# Patient Record
Sex: FEMALE | Race: WHITE | NOT HISPANIC OR LATINO
[De-identification: names, ages, dates, MRNs, and addresses within clinical notes are randomized per-mention and may not be internally consistent; named-entity substitution may affect disease eponyms.]

---

## 2017-02-03 PROBLEM — Z00.00 ENCOUNTER FOR PREVENTIVE HEALTH EXAMINATION: Status: ACTIVE | Noted: 2017-02-03

## 2017-02-03 NOTE — ASU PATIENT PROFILE, ADULT - PMH
Colon polyp    High cholesterol    MR (mitral regurgitation)    MVP (mitral valve prolapse)    MVP (mitral valve prolapse)    Pleurisy    RBBB    Sciatica    Vaginal polyp Colon polyp    High cholesterol    MR (mitral regurgitation)    MVP (mitral valve prolapse)    Pleurisy    RBBB    Sciatica    Vaginal polyp

## 2017-02-06 ENCOUNTER — TRANSCRIPTION ENCOUNTER (OUTPATIENT)
Age: 72
End: 2017-02-06

## 2017-02-06 ENCOUNTER — OUTPATIENT (OUTPATIENT)
Dept: OUTPATIENT SERVICES | Facility: HOSPITAL | Age: 72
LOS: 1 days | End: 2017-02-06
Payer: COMMERCIAL

## 2017-02-06 VITALS
HEART RATE: 69 BPM | OXYGEN SATURATION: 100 % | HEIGHT: 65.5 IN | TEMPERATURE: 99 F | WEIGHT: 143.3 LBS | RESPIRATION RATE: 14 BRPM | SYSTOLIC BLOOD PRESSURE: 155 MMHG | DIASTOLIC BLOOD PRESSURE: 69 MMHG

## 2017-02-06 VITALS
DIASTOLIC BLOOD PRESSURE: 54 MMHG | HEART RATE: 56 BPM | OXYGEN SATURATION: 100 % | SYSTOLIC BLOOD PRESSURE: 112 MMHG | RESPIRATION RATE: 16 BRPM

## 2017-02-06 DIAGNOSIS — H33.012 RETINAL DETACHMENT WITH SINGLE BREAK, LEFT EYE: ICD-10-CM

## 2017-02-06 DIAGNOSIS — Z90.89 ACQUIRED ABSENCE OF OTHER ORGANS: Chronic | ICD-10-CM

## 2017-02-06 PROCEDURE — C1814: CPT

## 2017-02-06 PROCEDURE — C1889: CPT

## 2017-02-06 PROCEDURE — C1784: CPT

## 2017-02-06 PROCEDURE — 67108 REPAIR DETACHED RETINA: CPT | Mod: LT

## 2017-02-06 NOTE — ASU DISCHARGE PLAN (ADULT/PEDIATRIC). - NOTIFY
Swelling that continues/Fever greater than 101/Bleeding that does not stop/Pain not relieved by Medications/Persistent Nausea and Vomiting

## 2017-02-06 NOTE — ASU DISCHARGE PLAN (ADULT/PEDIATRIC). - PT EDUC
other (specify)/Damien card, green bracelet on pt, Eye shield with instructions , sunglasses and eye kit given to patient.

## 2017-10-07 ENCOUNTER — EMERGENCY (EMERGENCY)
Facility: HOSPITAL | Age: 72
LOS: 1 days | Discharge: HOME | End: 2017-10-07
Attending: EMERGENCY MEDICINE | Admitting: EMERGENCY MEDICINE
Payer: COMMERCIAL

## 2017-10-07 VITALS
TEMPERATURE: 98 F | HEART RATE: 60 BPM | OXYGEN SATURATION: 100 % | WEIGHT: 141.98 LBS | HEIGHT: 65 IN | SYSTOLIC BLOOD PRESSURE: 136 MMHG | DIASTOLIC BLOOD PRESSURE: 85 MMHG
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
## 2017-10-07 DIAGNOSIS — Z88.2 ALLERGY STATUS TO SULFONAMIDES: ICD-10-CM

## 2017-10-07 DIAGNOSIS — I34.0 NONRHEUMATIC MITRAL (VALVE) INSUFFICIENCY: ICD-10-CM

## 2017-10-07 DIAGNOSIS — E78.5 HYPERLIPIDEMIA, UNSPECIFIED: ICD-10-CM

## 2017-10-07 DIAGNOSIS — Z90.89 ACQUIRED ABSENCE OF OTHER ORGANS: Chronic | ICD-10-CM
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
## 2017-10-07 DIAGNOSIS — T15.91XA FOREIGN BODY ON EXTERNAL EYE, PART UNSPECIFIED, RIGHT EYE, INITIAL ENCOUNTER: ICD-10-CM

## 2017-10-07 DIAGNOSIS — Z88.0 ALLERGY STATUS TO PENICILLIN: ICD-10-CM

## 2017-10-07 DIAGNOSIS — Z88.1 ALLERGY STATUS TO OTHER ANTIBIOTIC AGENTS STATUS: ICD-10-CM

## 2017-10-07 PROCEDURE — 99283 EMERGENCY DEPT VISIT LOW MDM: CPT

## 2017-10-07 NOTE — ED ADULT NURSE NOTE - PMH
Colon polyp    High cholesterol    MR (mitral regurgitation)    MVP (mitral valve prolapse)    Pleurisy    RBBB    Sciatica    Vaginal polyp

## 2017-10-07 NOTE — ED PROVIDER NOTE - ATTENDING CONTRIBUTION TO CARE
pt c/o fb sensation in right eye today. pt sometimes wears contacts, last used 2 weeks ago. no decrease in vision.  right eye contact lens in place, mild erythema conjunctiva, eomi, perrl.

## 2017-10-07 NOTE — ED PROVIDER NOTE - MEDICAL DECISION MAKING DETAILS
right foreign body sensation, found to have contact lense in eye, removed, follow up with opthalologist, opthalmic antibiotics

## 2017-10-07 NOTE — ED ADULT NURSE NOTE - OBJECTIVE STATEMENT
Pt reports possible foreign object in Right eye, reports has worsened, eye appears irritated and erythematous, no swelling noted, awaiting dispo

## 2017-10-07 NOTE — ED PROVIDER NOTE - PROGRESS NOTE DETAILS
pt now recalls that she couldn't find contact lens from her right eye last time she used contacts 2 weeks ago, and she noted that her vision from right eye was better than her baseline past 2 weeks pt now recalls that she couldn't find contact lens from her right eye last time she used contacts 2 weeks ago, and she noted that her vision from right eye was better than her baseline past 2 weeks. pt has tobramycin ophthalmic solution already, also has appt in 2 days with her ophthalmologist reeval after pt removed contact lens, cornea clear, no abrasion

## 2017-10-07 NOTE — ED PROVIDER NOTE - OBJECTIVE STATEMENT
73 yo female presents with right foreign body sensation states began this am.  denies discharge, no change in vision, states wears contacts occasionally, none used in 2 weeks.  Opthalmologist Dr Dinh

## 2017-10-08 PROBLEM — E78.00 PURE HYPERCHOLESTEROLEMIA, UNSPECIFIED: Chronic | Status: ACTIVE | Noted: 2017-02-03

## 2017-10-08 PROBLEM — M54.30 SCIATICA, UNSPECIFIED SIDE: Chronic | Status: ACTIVE | Noted: 2017-02-03

## 2017-10-08 PROBLEM — R09.1 PLEURISY: Chronic | Status: ACTIVE | Noted: 2017-02-03

## 2017-10-08 PROBLEM — I45.10 UNSPECIFIED RIGHT BUNDLE-BRANCH BLOCK: Chronic | Status: ACTIVE | Noted: 2017-02-03

## 2017-10-08 PROBLEM — N84.2 POLYP OF VAGINA: Chronic | Status: ACTIVE | Noted: 2017-02-03

## 2017-10-08 PROBLEM — I34.0 NONRHEUMATIC MITRAL (VALVE) INSUFFICIENCY: Chronic | Status: ACTIVE | Noted: 2017-02-03

## 2017-10-08 PROBLEM — I34.1 NONRHEUMATIC MITRAL (VALVE) PROLAPSE: Chronic | Status: ACTIVE | Noted: 2017-02-03

## 2017-10-08 PROBLEM — K63.5 POLYP OF COLON: Chronic | Status: ACTIVE | Noted: 2017-02-03

## 2018-05-10 ENCOUNTER — INPATIENT (INPATIENT)
Facility: HOSPITAL | Age: 73
LOS: 0 days | Discharge: ANOTHER HOSPITAL | DRG: 282 | End: 2018-05-11
Attending: INTERNAL MEDICINE | Admitting: HOSPITALIST
Payer: COMMERCIAL

## 2018-05-10 VITALS
DIASTOLIC BLOOD PRESSURE: 92 MMHG | SYSTOLIC BLOOD PRESSURE: 161 MMHG | HEIGHT: 66 IN | WEIGHT: 138.01 LBS | OXYGEN SATURATION: 98 % | HEART RATE: 86 BPM | RESPIRATION RATE: 15 BRPM | TEMPERATURE: 98 F

## 2018-05-10 DIAGNOSIS — Z90.89 ACQUIRED ABSENCE OF OTHER ORGANS: Chronic | ICD-10-CM

## 2018-05-10 LAB
ALBUMIN SERPL ELPH-MCNC: 3.7 G/DL — SIGNIFICANT CHANGE UP (ref 3.3–5)
ALP SERPL-CCNC: 70 U/L — SIGNIFICANT CHANGE UP (ref 40–120)
ALT FLD-CCNC: 22 U/L — SIGNIFICANT CHANGE UP (ref 12–78)
ANION GAP SERPL CALC-SCNC: 7 MMOL/L — SIGNIFICANT CHANGE UP (ref 5–17)
AST SERPL-CCNC: 23 U/L — SIGNIFICANT CHANGE UP (ref 15–37)
BILIRUB SERPL-MCNC: 0.2 MG/DL — SIGNIFICANT CHANGE UP (ref 0.2–1.2)
BUN SERPL-MCNC: 27 MG/DL — HIGH (ref 7–23)
CALCIUM SERPL-MCNC: 9.1 MG/DL — SIGNIFICANT CHANGE UP (ref 8.5–10.1)
CHLORIDE SERPL-SCNC: 105 MMOL/L — SIGNIFICANT CHANGE UP (ref 96–108)
CK SERPL-CCNC: 112 U/L — SIGNIFICANT CHANGE UP (ref 26–192)
CO2 SERPL-SCNC: 30 MMOL/L — SIGNIFICANT CHANGE UP (ref 22–31)
CREAT SERPL-MCNC: 0.78 MG/DL — SIGNIFICANT CHANGE UP (ref 0.5–1.3)
D DIMER BLD IA.RAPID-MCNC: <150 NG/ML DDU — SIGNIFICANT CHANGE UP
GLUCOSE SERPL-MCNC: 153 MG/DL — HIGH (ref 70–99)
HCT VFR BLD CALC: 41.5 % — SIGNIFICANT CHANGE UP (ref 34.5–45)
HGB BLD-MCNC: 14.4 G/DL — SIGNIFICANT CHANGE UP (ref 11.5–15.5)
INR BLD: 1.02 RATIO — SIGNIFICANT CHANGE UP (ref 0.88–1.16)
MCHC RBC-ENTMCNC: 30.8 PG — SIGNIFICANT CHANGE UP (ref 27–34)
MCHC RBC-ENTMCNC: 34.6 GM/DL — SIGNIFICANT CHANGE UP (ref 32–36)
MCV RBC AUTO: 88.9 FL — SIGNIFICANT CHANGE UP (ref 80–100)
PLATELET # BLD AUTO: 200 K/UL — SIGNIFICANT CHANGE UP (ref 150–400)
POTASSIUM SERPL-MCNC: 3.9 MMOL/L — SIGNIFICANT CHANGE UP (ref 3.5–5.3)
POTASSIUM SERPL-SCNC: 3.9 MMOL/L — SIGNIFICANT CHANGE UP (ref 3.5–5.3)
PROT SERPL-MCNC: 7.5 G/DL — SIGNIFICANT CHANGE UP (ref 6–8.3)
PROTHROM AB SERPL-ACNC: 11.1 SEC — SIGNIFICANT CHANGE UP (ref 9.8–12.7)
RBC # BLD: 4.67 M/UL — SIGNIFICANT CHANGE UP (ref 3.8–5.2)
RBC # FLD: 12 % — SIGNIFICANT CHANGE UP (ref 10.3–14.5)
SODIUM SERPL-SCNC: 142 MMOL/L — SIGNIFICANT CHANGE UP (ref 135–145)
TROPONIN I SERPL-MCNC: 0.02 NG/ML — SIGNIFICANT CHANGE UP (ref 0.01–0.04)
WBC # BLD: 7.2 K/UL — SIGNIFICANT CHANGE UP (ref 3.8–10.5)
WBC # FLD AUTO: 7.2 K/UL — SIGNIFICANT CHANGE UP (ref 3.8–10.5)

## 2018-05-10 RX ORDER — ASPIRIN/CALCIUM CARB/MAGNESIUM 324 MG
325 TABLET ORAL ONCE
Qty: 0 | Refills: 0 | Status: COMPLETED | OUTPATIENT
Start: 2018-05-10 | End: 2018-05-10

## 2018-05-10 RX ORDER — NITROGLYCERIN 6.5 MG
0.4 CAPSULE, EXTENDED RELEASE ORAL ONCE
Qty: 0 | Refills: 0 | Status: COMPLETED | OUTPATIENT
Start: 2018-05-10 | End: 2018-05-10

## 2018-05-10 RX ADMIN — Medication 325 MILLIGRAM(S): at 23:00

## 2018-05-10 RX ADMIN — Medication 0.4 MILLIGRAM(S): at 23:00

## 2018-05-10 NOTE — ED PROVIDER NOTE - OBJECTIVE STATEMENT
71 y/o WF H/O High cholesterol  MVP C/O Right jaw pain at rest, no CP, no SOB, no diaphoresis, no N/V. No Tooth pain, no dental pain, no TMJ.

## 2018-05-11 ENCOUNTER — TRANSCRIPTION ENCOUNTER (OUTPATIENT)
Age: 73
End: 2018-05-11

## 2018-05-11 ENCOUNTER — INPATIENT (INPATIENT)
Facility: HOSPITAL | Age: 73
LOS: 0 days | Discharge: HOME | DRG: 287 | End: 2018-05-11
Attending: INTERNAL MEDICINE | Admitting: INTERNAL MEDICINE
Payer: COMMERCIAL

## 2018-05-11 VITALS
TEMPERATURE: 98 F | SYSTOLIC BLOOD PRESSURE: 170 MMHG | HEART RATE: 74 BPM | RESPIRATION RATE: 14 BRPM | OXYGEN SATURATION: 98 % | DIASTOLIC BLOOD PRESSURE: 75 MMHG

## 2018-05-11 VITALS
WEIGHT: 139.99 LBS | HEART RATE: 79 BPM | SYSTOLIC BLOOD PRESSURE: 160 MMHG | RESPIRATION RATE: 18 BRPM | HEIGHT: 66 IN | OXYGEN SATURATION: 99 % | TEMPERATURE: 98 F | DIASTOLIC BLOOD PRESSURE: 85 MMHG

## 2018-05-11 VITALS
RESPIRATION RATE: 17 BRPM | SYSTOLIC BLOOD PRESSURE: 135 MMHG | OXYGEN SATURATION: 98 % | DIASTOLIC BLOOD PRESSURE: 67 MMHG | TEMPERATURE: 98 F | HEART RATE: 72 BPM

## 2018-05-11 DIAGNOSIS — I21.4 NON-ST ELEVATION (NSTEMI) MYOCARDIAL INFARCTION: ICD-10-CM

## 2018-05-11 DIAGNOSIS — Z29.9 ENCOUNTER FOR PROPHYLACTIC MEASURES, UNSPECIFIED: ICD-10-CM

## 2018-05-11 DIAGNOSIS — Z98.890 OTHER SPECIFIED POSTPROCEDURAL STATES: Chronic | ICD-10-CM

## 2018-05-11 DIAGNOSIS — I24.9 ACUTE ISCHEMIC HEART DISEASE, UNSPECIFIED: ICD-10-CM

## 2018-05-11 DIAGNOSIS — Z90.89 ACQUIRED ABSENCE OF OTHER ORGANS: Chronic | ICD-10-CM

## 2018-05-11 DIAGNOSIS — M26.69 OTHER SPECIFIED DISORDERS OF TEMPOROMANDIBULAR JOINT: ICD-10-CM

## 2018-05-11 LAB
CK SERPL-CCNC: 86 U/L — SIGNIFICANT CHANGE UP (ref 26–192)
TROPONIN I SERPL-MCNC: 0.24 NG/ML — HIGH (ref 0.01–0.04)

## 2018-05-11 PROCEDURE — 84484 ASSAY OF TROPONIN QUANT: CPT

## 2018-05-11 PROCEDURE — 71045 X-RAY EXAM CHEST 1 VIEW: CPT | Mod: 26

## 2018-05-11 PROCEDURE — C1894: CPT

## 2018-05-11 PROCEDURE — 93458 L HRT ARTERY/VENTRICLE ANGIO: CPT | Mod: 26

## 2018-05-11 PROCEDURE — 99285 EMERGENCY DEPT VISIT HI MDM: CPT

## 2018-05-11 PROCEDURE — C1769: CPT

## 2018-05-11 PROCEDURE — 85379 FIBRIN DEGRADATION QUANT: CPT

## 2018-05-11 PROCEDURE — 99285 EMERGENCY DEPT VISIT HI MDM: CPT | Mod: 25

## 2018-05-11 PROCEDURE — 85610 PROTHROMBIN TIME: CPT

## 2018-05-11 PROCEDURE — 85027 COMPLETE CBC AUTOMATED: CPT

## 2018-05-11 PROCEDURE — 96376 TX/PRO/DX INJ SAME DRUG ADON: CPT

## 2018-05-11 PROCEDURE — 96374 THER/PROPH/DIAG INJ IV PUSH: CPT

## 2018-05-11 PROCEDURE — 93458 L HRT ARTERY/VENTRICLE ANGIO: CPT

## 2018-05-11 PROCEDURE — 93010 ELECTROCARDIOGRAM REPORT: CPT

## 2018-05-11 PROCEDURE — 82550 ASSAY OF CK (CPK): CPT

## 2018-05-11 PROCEDURE — 71045 X-RAY EXAM CHEST 1 VIEW: CPT

## 2018-05-11 PROCEDURE — C1887: CPT

## 2018-05-11 PROCEDURE — 93005 ELECTROCARDIOGRAM TRACING: CPT

## 2018-05-11 PROCEDURE — 99291 CRITICAL CARE FIRST HOUR: CPT

## 2018-05-11 PROCEDURE — 80053 COMPREHEN METABOLIC PANEL: CPT

## 2018-05-11 PROCEDURE — 99223 1ST HOSP IP/OBS HIGH 75: CPT | Mod: AI

## 2018-05-11 PROCEDURE — 99152 MOD SED SAME PHYS/QHP 5/>YRS: CPT

## 2018-05-11 RX ORDER — METOPROLOL TARTRATE 50 MG
25 TABLET ORAL
Qty: 0 | Refills: 0 | Status: DISCONTINUED | OUTPATIENT
Start: 2018-05-11 | End: 2018-05-11

## 2018-05-11 RX ORDER — HEPARIN SODIUM 5000 [USP'U]/ML
3800 INJECTION INTRAVENOUS; SUBCUTANEOUS ONCE
Qty: 0 | Refills: 0 | Status: COMPLETED | OUTPATIENT
Start: 2018-05-11 | End: 2018-05-11

## 2018-05-11 RX ORDER — METOPROLOL TARTRATE 50 MG
1 TABLET ORAL
Qty: 0 | Refills: 0 | COMMUNITY

## 2018-05-11 RX ORDER — SIMVASTATIN 20 MG/1
40 TABLET, FILM COATED ORAL DAILY
Qty: 0 | Refills: 0 | Status: DISCONTINUED | OUTPATIENT
Start: 2018-05-11 | End: 2018-05-11

## 2018-05-11 RX ORDER — HEPARIN SODIUM 5000 [USP'U]/ML
INJECTION INTRAVENOUS; SUBCUTANEOUS
Qty: 25000 | Refills: 0 | Status: DISCONTINUED | OUTPATIENT
Start: 2018-05-11 | End: 2018-05-11

## 2018-05-11 RX ORDER — ASPIRIN/CALCIUM CARB/MAGNESIUM 324 MG
1 TABLET ORAL
Qty: 0 | Refills: 0 | COMMUNITY

## 2018-05-11 RX ORDER — SIMVASTATIN 20 MG/1
1 TABLET, FILM COATED ORAL
Qty: 0 | Refills: 0 | COMMUNITY

## 2018-05-11 RX ORDER — ASPIRIN/CALCIUM CARB/MAGNESIUM 324 MG
325 TABLET ORAL DAILY
Qty: 0 | Refills: 0 | Status: DISCONTINUED | OUTPATIENT
Start: 2018-05-11 | End: 2018-05-11

## 2018-05-11 RX ORDER — HEPARIN SODIUM 5000 [USP'U]/ML
0 INJECTION INTRAVENOUS; SUBCUTANEOUS
Qty: 0 | Refills: 0 | COMMUNITY

## 2018-05-11 RX ORDER — CLOPIDOGREL BISULFATE 75 MG/1
2 TABLET, FILM COATED ORAL
Qty: 0 | Refills: 0 | COMMUNITY

## 2018-05-11 RX ORDER — SIMVASTATIN 20 MG/1
40 TABLET, FILM COATED ORAL AT BEDTIME
Qty: 0 | Refills: 0 | Status: DISCONTINUED | OUTPATIENT
Start: 2018-05-11 | End: 2018-05-11

## 2018-05-11 RX ORDER — CLOPIDOGREL BISULFATE 75 MG/1
600 TABLET, FILM COATED ORAL ONCE
Qty: 0 | Refills: 0 | Status: COMPLETED | OUTPATIENT
Start: 2018-05-11 | End: 2018-05-11

## 2018-05-11 RX ORDER — HEPARIN SODIUM 5000 [USP'U]/ML
3800 INJECTION INTRAVENOUS; SUBCUTANEOUS EVERY 6 HOURS
Qty: 0 | Refills: 0 | Status: DISCONTINUED | OUTPATIENT
Start: 2018-05-11 | End: 2018-05-11

## 2018-05-11 RX ADMIN — CLOPIDOGREL BISULFATE 600 MILLIGRAM(S): 75 TABLET, FILM COATED ORAL at 07:02

## 2018-05-11 RX ADMIN — HEPARIN SODIUM 3800 UNIT(S): 5000 INJECTION INTRAVENOUS; SUBCUTANEOUS at 07:02

## 2018-05-11 RX ADMIN — HEPARIN SODIUM 750 UNIT(S)/HR: 5000 INJECTION INTRAVENOUS; SUBCUTANEOUS at 07:19

## 2018-05-11 NOTE — DISCHARGE NOTE ADULT - HOSPITAL COURSE
72f apparently with mild hyperlipidemia, improved with diet, MR, MVP,  Former smoker.  No baseline sxs of angina, hf or arrhythmia. Pt Feeling well until last night when developed  8/10 right sided jaw discomfort, without rad or assoc sxs.  Did not improve with a warm compress or Tylenol and became concerned and came to the  ER [place] on 5/10/18.  EKG without ischema.  First set of cardiac enzymes normal, second set with significant elevation of troponin suggesting ischemic process.    Trop were .020>0.238  ACS Protocol initiated-ASA/Plavix/Heparin drip  Pt transferred to [facility] for Cardiac Cath.     S/P Diagnostic Cath-Normal Coronaries, LVEF 60%. Pt tolerated procedure well with no post cath complications and hospitalization remained uneventful. Pt stable for discharge home.

## 2018-05-11 NOTE — DISCHARGE NOTE ADULT - ADDITIONAL INSTRUCTIONS
No heavy lifting or pushing/pulling with procedure arm for 2 weeks. No driving for 2 days. You may shower 24 hours following the procedure but avoid baths/swimming for 1 week. Check your wrist site for bleeding and/or swelling daily following procedure and call your doctor immediately if it occurs or if you experience increased pain at the site. Follow up with your cardiologist in 1-2 weeks. You may call [facility] if you have any questions/concerns regarding your procedure [phone].

## 2018-05-11 NOTE — H&P ADULT - NSHPLABSRESULTS_GEN_ALL_CORE
14.4   7.2   )-----------( 200      ( 10 May 2018 23:20 )             41.5     05-10    142  |  105  |  27<H>  ----------------------------<  153<H>  3.9   |  30  |  0.78    Ca    9.1      10 May 2018 23:20    TPro  7.5  /  Alb  3.7  /  TBili  0.2  /  DBili  x   /  AST  23  /  ALT  22  /  AlkPhos  70  05-10    PT/INR - ( 10 May 2018 23:20 )   PT: 11.1 sec;   INR: 1.02 ratio    My interpretation:  EKG 5/10/18 22:47: NSR at 75 bpm no evidence of st elevation or depression, single lead T wave inversion on Lead III   Repeat EKG 5/11/18 6:08: NSR at 75 bpm no evidence of st elevation or depression, single lead T wave inversion on Lead III

## 2018-05-11 NOTE — DISCHARGE NOTE ADULT - CARE PLAN
Principal Discharge DX:	Chest pain, unspecified type  Goal:	You will be free of chest pain or shortness of breath.  Assessment and plan of treatment:	Low salt, low fat diet.   Weight management.   Take medications as prescribed.    No smoking.  Follow up appointments with your doctor(s)  as instruced.

## 2018-05-11 NOTE — H&P ADULT - PROBLEM SELECTOR PLAN 1
admit to tele  - started on heparin drip  - Plavix load given  - aspirin, metoprolol and Zocor ordered  - Dr. Leonard cardio consulted by ED  - 2decho ordered  - will check TSH and Ft4 level  - monitor serial enzymes  - will keep npo except meds for now until cardio evals patient

## 2018-05-11 NOTE — DISCHARGE NOTE ADULT - CARE PROVIDER_API CALL
Roger Lopez  [address] [place] [address], [place] [address]  Phone: [phone]  Fax: (       -

## 2018-05-11 NOTE — H&P ADULT - HISTORY OF PRESENT ILLNESS
73 y/o female with no past medical history per patient comes in with cc of right jaw pain that began last night at 10pm. She tried to apply warm compressions with no relief and called 911 came to ER for eval. In ER pt second trop is positive. Pt denies any substernal chest pain, no loc, no dizziness, no n/v no headaches. She does feel a little lightheaded, no prior episodes like this. Pt being admitted for NSTEMI.

## 2018-05-11 NOTE — CONSULT NOTE ADULT - SUBJECTIVE AND OBJECTIVE BOX
[facility]         Jenelle Scales, Abi, Gwen, Vandana, Fred Heredia        [phone] (office)    CHIEF COMPLAINT: Patient is a 72y old  Female who presents with a chief complaint of right jaw claudication (11 May 2018 07:54)      HPI:  72f apparently with mild hyperlipidemia, improved with diet.  No htn, dm.  Former smoker.  No baseline sxs of angina, hf or arrhythmia.    Feeling well until last night when developed  8/10 right sided jaw discomfort, without rad or assoc sxs.  Did not improve with a warm compress or Tylenol and became concerned and came to the er.  EKG without ischema.  First set of cardiac enzymes normal, second set with significant elevation of troponin suggesting ischemic process.  Given dapt and heparin gtt.  Consultation is now being obtained.        PAST MEDICAL & SURGICAL HISTORY:  Colon polyp  Vaginal polyp  Sciatica  RBBB  Pleurisy  MVP (mitral valve prolapse)  MR (mitral regurgitation)  High cholesterol  History of tonsillectomy      SOCIAL HISTORY: No active tobacco, alcohol or illicit drug use    FAMILY HISTORY:  Family history of multiple myeloma (Sibling)      Outpatient medications: none    MEDICATIONS  (STANDING):  aspirin enteric coated 325 milliGRAM(s) Oral daily  heparin  Infusion.  Unit(s)/Hr (7.5 mL/Hr) IV Continuous <Continuous>  metoprolol tartrate 25 milliGRAM(s) Oral two times a day  simvastatin 40 milliGRAM(s) Oral daily    MEDICATIONS  (PRN):  heparin  Injectable 3800 Unit(s) IV Push every 6 hours PRN For aPTT less than 40      Allergies    cephalexin (Faint)  Cipro (Rash)  penicillin (Unknown)  sulfa drugs (Rash)    Intolerances    codeine (Nausea)  Macrobid (Nausea; Fever)      REVIEW OF SYSTEMS: Is negative for eye, ENT, GI, , allergic, dermatologic, musculoskeletal and neurologic, except as described above.    VITAL SIGNS:   Vital Signs Last 24 Hrs  T(C): 36.8 (11 May 2018 08:07), Max: 36.9 (10 May 2018 22:39)  T(F): 98.3 (11 May 2018 08:07), Max: 98.4 (10 May 2018 22:39)  HR: 74 (11 May 2018 08:07) (56 - 86)  BP: 170/75 (11 May 2018 08:07) (112/56 - 170/75)  BP(mean): --  RR: 14 (11 May 2018 08:07) (12 - 15)  SpO2: 98% (11 May 2018 08:07) (97% - 98%)    I&O's Summary      PHYSICAL EXAM:    Constitutional: NAD, awake and alert, well-developed  Eyes:  EOMI, no oral cyanosis, conjunctivae clear, anicteric.  Pulmonary: Non-labored, breath sounds are clear bilaterally, no wheezing, rales or rhonchi  Cardiovascular:  regular S1 and S2. No murmur.  No rubs, gallops or clicks  Gastrointestinal: Bowel Sounds present, soft, nontender.   Lymph: No peripheral edema.   Neurological: Alert, strength and sensitivity are grossly intact  Skin: No obvious lesions/rashes.   Psych:  Mood & affect appropriate .    LABS: All Labs Reviewed:                        14.4   7.2   )-----------( 200      ( 10 May 2018 23:20 )             41.5     10 May 2018 23:20    142    |  105    |  27     ----------------------------<  153    3.9     |  30     |  0.78     Ca    9.1        10 May 2018 23:20    TPro  7.5    /  Alb  3.7    /  TBili  0.2    /  DBili  x      /  AST  23     /  ALT  22     /  AlkPhos  70     10 May 2018 23:20    PT/INR - ( 10 May 2018 23:20 )   PT: 11.1 sec;   INR: 1.02 ratio           CARDIAC MARKERS ( 11 May 2018 05:22 )  .238 ng/mL / x     / 86 U/L / x     / x      CARDIAC MARKERS ( 10 May 2018 23:20 )  .020 ng/mL / x     / 112 U/L / x     / x          Blood Culture:         RADIOLOGY:    EKG: nsr

## 2018-05-11 NOTE — ED ADULT NURSE REASSESSMENT NOTE - COMFORT CARE
repositioned/assisted to bathroom/side rails up/wait time explained/warm blanket provided/plan of care explained/darkened lights

## 2018-05-11 NOTE — CONSULT NOTE ADULT - ASSESSMENT
72f apparently with mild hyperlipidemia, improved with diet.  No htn, dm.  Former smoker.  No baseline sxs of angina, hf or arrhythmia.    Feeling well until last night when developed  8/10 right sided jaw discomfort, without rad or assoc sxs.  Did not improve with a warm compress or Tylenol and became concerned and came to the er.  EKG without ischema.  First set of cardiac enzymes normal, second set with significant elevation of troponin suggesting ischemic process.  Given dapt and heparin gtt.  Consultation is now being obtained.      -intermediate risk individual, based on age and dyslipidemia  -jaw discomfort may be ischemic sx  -normal ekg, but elevation in troponin  --given that there is no other clear explanation for her jaw discomfort other than a manifestation of acs, will treat aggressively  -dapt, heparin, statin, bb  -have arranged tx to [facility] for cardiac cath, with intervention if indicated    The patient is at risk of abrupt decompensation.  35 minutes was spent with this patient.

## 2018-05-11 NOTE — DISCHARGE NOTE ADULT - PLAN OF CARE
You will be free of chest pain or shortness of breath. Low salt, low fat diet.   Weight management.   Take medications as prescribed.    No smoking.  Follow up appointments with your doctor(s)  as instruced.

## 2018-05-11 NOTE — DISCHARGE NOTE ADULT - HOSPITAL COURSE
73 y/o female with no past medical history per patient comes in with cc of right jaw pain that began last night at 10pm. She tried to apply warm compressions with no relief and called 911 came to ER for eval. In ER pt second trop is positive. Pt denies any substernal chest pain, no loc, no dizziness, no n/v no headaches. She does feel a little lightheaded, no prior episodes like this. Pt was admitted for NSTEMI.      Problem/Plan - 1:  ·  Problem: NSTEMI (non-ST elevated myocardial infarction).  Plan: admit to tele  - started on heparin drip  - Plavix load given  - aspirin, metoprolol and Zocor ordered  - Dr. Leonard cardio recommend transfer to [place]   - will keep npo except meds for cardiac cath     Problem/Plan - 2:  ·  Problem: Jaw claudication.  Plan: Likely due to above   - plan as above    pt transferred to [facility]

## 2018-05-11 NOTE — DISCHARGE NOTE ADULT - CARE PLAN
Principal Discharge DX:	NSTEMI (non-ST elevated myocardial infarction)  Goal:	transferred to [place]  Assessment and plan of treatment:	to [place] for cardiac cath  Secondary Diagnosis:	Jaw claudication

## 2018-05-11 NOTE — ED ADULT NURSE NOTE - CHPI ED SYMPTOMS NEG
no diaphoresis/no chills/no syncope/no dizziness/no back pain/no chest pain/no cough/no fever/no vomiting/no shortness of breath/no nausea

## 2018-05-11 NOTE — DISCHARGE NOTE ADULT - MEDICATION SUMMARY - MEDICATIONS TO STOP TAKING
I will STOP taking the medications listed below when I get home from the hospital:    heparin 1 unit/mL-NaCl 0.9% intravenous solution (obsolete)  -- hospital    aspirin 325 mg oral tablet  -- 1 tab(s) by mouth once a day    hospital    Plavix 300 mg oral tablet  -- 2 tab(s) by mouth once    hospital    metoprolol tartrate 25 mg oral tablet  -- 1 tab(s) by mouth 2 times a day    hospital    Zocor 40 mg oral tablet  -- 1 tab(s) by mouth once a day (at bedtime)    hospital

## 2018-05-11 NOTE — DISCHARGE NOTE ADULT - CONDITIONS AT DISCHARGE
Pt verbalized understanding of discharge instructions. Pt alert and oriented x4, ambulatory, voiding, tolerating diet, vss. Pt verbalized understanding of medications prescribed and to follow up with MD in time ordered.  IVL removed. Safety maintained.  Pt s/p cath no complaint of pain. right radial  site benign no bleeding or hematoma noted.  Positive pulses in bilateral dorsalis pedis. Extremities warm and mobile.

## 2018-05-11 NOTE — DISCHARGE NOTE ADULT - PATIENT PORTAL LINK FT
You can access the [facility] Portal, offered by [facility], by registering with the following website: [email or web address]

## 2018-05-11 NOTE — ED ADULT NURSE NOTE - OBJECTIVE STATEMENT
Pt to ED with c/c pain to jaw tonight, pt took Tylenol w/o relief. Pain unrelieved by Nitroglycerine or aspirin. Pt denies chest pain, arm pain, or diaphoresis

## 2018-05-11 NOTE — H&P CARDIOLOGY - HISTORY OF PRESENT ILLNESS
72f apparently with mild hyperlipidemia, improved with diet, MR, MVP,  Former smoker.  No baseline sxs of angina, hf or arrhythmia. Pt Feeling well until last night when developed  8/10 right sided jaw discomfort, without rad or assoc sxs.  Did not improve with a warm compress or Tylenol and became concerned and came to the  ER [place] on 5/11/18.  EKG without ischema.  First set of cardiac enzymes normal, second set with significant elevation of troponin suggesting ischemic process.    Trop were .020>0.238  Pt transferred to [facility] for Cardiac Cath. 72f apparently with mild hyperlipidemia, improved with diet, MR, MVP,  Former smoker.  No baseline sxs of angina, hf or arrhythmia. Pt Feeling well until last night when developed  8/10 right sided jaw discomfort, without rad or assoc sxs.  Did not improve with a warm compress or Tylenol and became concerned and came to the  ER [place] on 5/10/18.  EKG without ischema.  First set of cardiac enzymes normal, second set with significant elevation of troponin suggesting ischemic process.    Trop were .020>0.238  Pt transferred to [facility] for Cardiac Cath. 72f apparently with mild hyperlipidemia, improved with diet, MR, MVP,  Former smoker.  No baseline sxs of angina, hf or arrhythmia. Pt Feeling well until last night when developed  8/10 right sided jaw discomfort, without rad or assoc sxs.  Did not improve with a warm compress or Tylenol and became concerned and came to the  ER [place] on 5/10/18.  EKG without ischema.  First set of cardiac enzymes normal, second set with significant elevation of troponin suggesting ischemic process.    Trop were .020>0.238  ACS Protocol initiated-ASA/Plavix/Heparin drip  Pt transferred to [facility] for Cardiac Cath.

## 2018-05-11 NOTE — ED ADULT NURSE NOTE - CHIEF COMPLAINT QUOTE
"im having jaw pain today" pt experienced neck pain yesterday. pt also c/o intermittent palpitations. pt denies blurred vision, chest pain, dizziness, trauma/injury, sob

## 2018-05-11 NOTE — DISCHARGE NOTE ADULT - PROVIDER TOKENS
FREE:[LAST:[Jessica],FIRST:[Roger Park],PHONE:[phone]],FAX:[(   [phone]    -],ADDRESS:[[address] [place] [address]]]

## 2018-05-11 NOTE — ED ADULT NURSE REASSESSMENT NOTE - NS ED NURSE REASSESS COMMENT FT1
pt resting comfortably with HOB elevated at 60 degrees. call bell in reach. Pt states pain persits to jaw, no change after nitro and aspirin. Pt to have repeat labs at 0500
received in no distress, aox3,no sob,denies chest pain,v/sstable and awaits room assignment on bed with rails up.

## 2018-05-11 NOTE — H&P ADULT - NSHPPHYSICALEXAM_GEN_ALL_CORE
PHYSICAL EXAM:  Vital Signs Last 24 Hrs  T(C): 36.5 (11 May 2018 05:10), Max: 36.9 (10 May 2018 22:39)  T(F): 97.7 (11 May 2018 05:10), Max: 98.4 (10 May 2018 22:39)  HR: 56 (11 May 2018 05:10) (56 - 86)  BP: 119/54 (11 May 2018 05:10) (112/56 - 161/92)  BP(mean): --  RR: 12 (11 May 2018 05:10) (12 - 15)  SpO2: 97% (11 May 2018 05:10) (97% - 98%)    GENERAL: NAD, well-groomed, well-developed  HEAD:  Atraumatic, Normocephalic  EYES: EOMI, PERRLA, conjunctiva and sclera clear  ENMT: No tonsillar erythema, exudates, or enlargement; Moist mucous membranes, Good dentition, No lesions  NECK: Supple, No JVD, no carotid briuts  NERVOUS SYSTEM:  Alert & Oriented X3, Good concentration; Motor Strength 5/5 B/L upper and lower extremities  CHEST/LUNG: Clear to auscultation bilaterally; No rales, rhonchi, wheezing, or rubs  HEART: Regular rate and rhythm; No murmurs  ABDOMEN: Soft, Nontender, Nondistended; Bowel sounds present  EXTREMITIES:  2+ Peripheral Pulses, No clubbing, cyanosis, or edema  SKIN: No rashes or lesions

## 2018-05-11 NOTE — H&P ADULT - ASSESSMENT
71 y/o female with no past medical history per patient comes in with cc of right jaw pain that began last night at 10pm. She tried to apply warm compressions with no relief and called 911 came to ER for eval. In ER pt second trop is positive. Pt denies any substernal chest pain, no loc, no dizziness, no n/v no headaches. She does feel a little lightheaded, no prior episodes like this. Pt being admitted for NSTEMI.

## 2020-11-28 NOTE — H&P CARDIOLOGY - PRO TOBACCO TYPE
Pt had ERIKA drain placed to abd today. Draining well. PRN pain med administered per order. IV abt administered through DL PICC. Pt up ambulating independently. All needs voiced and met. Will continue with plan of care.   cigarettes/quit 37 yrs ago

## 2023-01-28 ENCOUNTER — OFFICE (OUTPATIENT)
Dept: URBAN - METROPOLITAN AREA CLINIC 109 | Facility: CLINIC | Age: 78
Setting detail: OPHTHALMOLOGY
End: 2023-01-28
Payer: MEDICARE

## 2023-01-28 VITALS — HEIGHT: 55 IN

## 2023-01-28 DIAGNOSIS — H40.013: ICD-10-CM

## 2023-01-28 PROCEDURE — 99213 OFFICE O/P EST LOW 20 MIN: CPT | Performed by: OPHTHALMOLOGY

## 2023-01-28 ASSESSMENT — REFRACTION_AUTOREFRACTION
OD_AXIS: 169
OS_AXIS: 178
OS_CYLINDER: -1.25
OD_CYLINDER: -0.50
OD_SPHERE: +0.25
OS_SPHERE: +1.25

## 2023-01-28 ASSESSMENT — CONFRONTATIONAL VISUAL FIELD TEST (CVF)
OD_FINDINGS: FULL
OS_FINDINGS: FULL

## 2023-01-28 ASSESSMENT — VISUAL ACUITY
OS_BCVA: 20/20
OD_BCVA: 20/20

## 2023-01-28 ASSESSMENT — TONOMETRY
OS_IOP_MMHG: 16
OD_IOP_MMHG: 16

## 2023-01-28 ASSESSMENT — KERATOMETRY
OS_K2POWER_DIOPTERS: 44.12
OS_AXISANGLE_DEGREES: 93
OD_K1POWER_DIOPTERS: 41.87
OD_AXISANGLE_DEGREES: 81
OD_K2POWER_DIOPTERS: 42.87
OS_K1POWER_DIOPTERS: 42.12

## 2023-01-28 ASSESSMENT — SUPERFICIAL PUNCTATE KERATITIS (SPK)
OD_SPK: 1+
OS_SPK: 1+

## 2023-01-28 ASSESSMENT — SPHEQUIV_DERIVED
OD_SPHEQUIV: 0
OS_SPHEQUIV: 0.625

## 2023-01-28 ASSESSMENT — AXIALLENGTH_DERIVED
OS_AL: 23.4884
OD_AL: 24.0144

## 2023-03-15 NOTE — ASU PREOP CHECKLIST - ISOLATION PRECAUTIONS
none Price (Do Not Change): 0.00 Detail Level: Simple Instructions: This plan will send the code FBSE to the PM system.  DO NOT or CHANGE the price.

## 2023-05-10 ENCOUNTER — OFFICE (OUTPATIENT)
Dept: URBAN - METROPOLITAN AREA CLINIC 109 | Facility: CLINIC | Age: 78
Setting detail: OPHTHALMOLOGY
End: 2023-05-10
Payer: MEDICARE

## 2023-05-10 DIAGNOSIS — H40.013: ICD-10-CM

## 2023-05-10 DIAGNOSIS — H33.002: ICD-10-CM

## 2023-05-10 DIAGNOSIS — H16.223: ICD-10-CM

## 2023-05-10 DIAGNOSIS — D31.32: ICD-10-CM

## 2023-05-10 DIAGNOSIS — H43.811: ICD-10-CM

## 2023-05-10 PROCEDURE — 92083 EXTENDED VISUAL FIELD XM: CPT | Performed by: OPHTHALMOLOGY

## 2023-05-10 PROCEDURE — 92133 CPTRZD OPH DX IMG PST SGM ON: CPT | Performed by: OPHTHALMOLOGY

## 2023-05-10 PROCEDURE — 99213 OFFICE O/P EST LOW 20 MIN: CPT | Performed by: OPHTHALMOLOGY

## 2023-05-10 ASSESSMENT — SUPERFICIAL PUNCTATE KERATITIS (SPK)
OS_SPK: 1+
OD_SPK: 1+

## 2023-05-10 ASSESSMENT — TONOMETRY
OD_IOP_MMHG: 15
OS_IOP_MMHG: 16

## 2023-05-10 ASSESSMENT — KERATOMETRY
OS_K2POWER_DIOPTERS: 44.12
OS_AXISANGLE_DEGREES: 93
OD_AXISANGLE_DEGREES: 81
OD_K2POWER_DIOPTERS: 42.87
OD_K1POWER_DIOPTERS: 41.87
OS_K1POWER_DIOPTERS: 42.12

## 2023-05-10 ASSESSMENT — VISUAL ACUITY
OD_BCVA: 20/20
OS_BCVA: 20/20

## 2023-05-10 ASSESSMENT — REFRACTION_AUTOREFRACTION
OS_CYLINDER: -0.50
OD_SPHERE: -0.25
OD_CYLINDER: -0.25
OS_SPHERE: PLANO
OS_AXIS: 32
OD_AXIS: 113

## 2023-05-10 ASSESSMENT — CONFRONTATIONAL VISUAL FIELD TEST (CVF)
OS_FINDINGS: FULL
OD_FINDINGS: FULL

## 2023-05-10 ASSESSMENT — SPHEQUIV_DERIVED: OD_SPHEQUIV: -0.375

## 2023-05-10 ASSESSMENT — AXIALLENGTH_DERIVED: OD_AL: 24.17

## 2023-08-31 ENCOUNTER — NON-APPOINTMENT (OUTPATIENT)
Age: 78
End: 2023-08-31

## 2024-01-09 ENCOUNTER — EMERGENCY (EMERGENCY)
Facility: HOSPITAL | Age: 79
LOS: 1 days | Discharge: HOME | End: 2024-01-09
Attending: EMERGENCY MEDICINE | Admitting: EMERGENCY MEDICINE
Payer: COMMERCIAL

## 2024-01-09 VITALS
OXYGEN SATURATION: 99 % | WEIGHT: 143.96 LBS | RESPIRATION RATE: 20 BRPM | TEMPERATURE: 98 F | HEART RATE: 57 BPM | SYSTOLIC BLOOD PRESSURE: 159 MMHG | DIASTOLIC BLOOD PRESSURE: 86 MMHG

## 2024-01-09 DIAGNOSIS — Z90.89 ACQUIRED ABSENCE OF OTHER ORGANS: Chronic | ICD-10-CM

## 2024-01-09 DIAGNOSIS — Z98.890 OTHER SPECIFIED POSTPROCEDURAL STATES: Chronic | ICD-10-CM

## 2024-01-09 LAB
ALBUMIN SERPL ELPH-MCNC: 3.1 G/DL — LOW (ref 3.3–5)
ALBUMIN SERPL ELPH-MCNC: 3.1 G/DL — LOW (ref 3.3–5)
ALP SERPL-CCNC: 72 U/L — SIGNIFICANT CHANGE UP (ref 40–120)
ALP SERPL-CCNC: 72 U/L — SIGNIFICANT CHANGE UP (ref 40–120)
ALT FLD-CCNC: 19 U/L — SIGNIFICANT CHANGE UP (ref 12–78)
ALT FLD-CCNC: 19 U/L — SIGNIFICANT CHANGE UP (ref 12–78)
ANION GAP SERPL CALC-SCNC: 3 MMOL/L — LOW (ref 5–17)
ANION GAP SERPL CALC-SCNC: 3 MMOL/L — LOW (ref 5–17)
AST SERPL-CCNC: 26 U/L — SIGNIFICANT CHANGE UP (ref 15–37)
AST SERPL-CCNC: 26 U/L — SIGNIFICANT CHANGE UP (ref 15–37)
BASOPHILS # BLD AUTO: 0.04 K/UL — SIGNIFICANT CHANGE UP (ref 0–0.2)
BASOPHILS # BLD AUTO: 0.04 K/UL — SIGNIFICANT CHANGE UP (ref 0–0.2)
BASOPHILS NFR BLD AUTO: 0.4 % — SIGNIFICANT CHANGE UP (ref 0–2)
BASOPHILS NFR BLD AUTO: 0.4 % — SIGNIFICANT CHANGE UP (ref 0–2)
BILIRUB SERPL-MCNC: 0.3 MG/DL — SIGNIFICANT CHANGE UP (ref 0.2–1.2)
BILIRUB SERPL-MCNC: 0.3 MG/DL — SIGNIFICANT CHANGE UP (ref 0.2–1.2)
BUN SERPL-MCNC: 18 MG/DL — SIGNIFICANT CHANGE UP (ref 7–23)
BUN SERPL-MCNC: 18 MG/DL — SIGNIFICANT CHANGE UP (ref 7–23)
CALCIUM SERPL-MCNC: 8.5 MG/DL — SIGNIFICANT CHANGE UP (ref 8.5–10.1)
CALCIUM SERPL-MCNC: 8.5 MG/DL — SIGNIFICANT CHANGE UP (ref 8.5–10.1)
CHLORIDE SERPL-SCNC: 111 MMOL/L — HIGH (ref 96–108)
CHLORIDE SERPL-SCNC: 111 MMOL/L — HIGH (ref 96–108)
CO2 SERPL-SCNC: 28 MMOL/L — SIGNIFICANT CHANGE UP (ref 22–31)
CO2 SERPL-SCNC: 28 MMOL/L — SIGNIFICANT CHANGE UP (ref 22–31)
CREAT SERPL-MCNC: 0.78 MG/DL — SIGNIFICANT CHANGE UP (ref 0.5–1.3)
CREAT SERPL-MCNC: 0.78 MG/DL — SIGNIFICANT CHANGE UP (ref 0.5–1.3)
EGFR: 77 ML/MIN/1.73M2 — SIGNIFICANT CHANGE UP
EGFR: 77 ML/MIN/1.73M2 — SIGNIFICANT CHANGE UP
EOSINOPHIL # BLD AUTO: 0.07 K/UL — SIGNIFICANT CHANGE UP (ref 0–0.5)
EOSINOPHIL # BLD AUTO: 0.07 K/UL — SIGNIFICANT CHANGE UP (ref 0–0.5)
EOSINOPHIL NFR BLD AUTO: 0.7 % — SIGNIFICANT CHANGE UP (ref 0–6)
EOSINOPHIL NFR BLD AUTO: 0.7 % — SIGNIFICANT CHANGE UP (ref 0–6)
GLUCOSE SERPL-MCNC: 108 MG/DL — HIGH (ref 70–99)
GLUCOSE SERPL-MCNC: 108 MG/DL — HIGH (ref 70–99)
HCT VFR BLD CALC: 39.1 % — SIGNIFICANT CHANGE UP (ref 34.5–45)
HCT VFR BLD CALC: 39.1 % — SIGNIFICANT CHANGE UP (ref 34.5–45)
HGB BLD-MCNC: 12.7 G/DL — SIGNIFICANT CHANGE UP (ref 11.5–15.5)
HGB BLD-MCNC: 12.7 G/DL — SIGNIFICANT CHANGE UP (ref 11.5–15.5)
IMM GRANULOCYTES NFR BLD AUTO: 0.2 % — SIGNIFICANT CHANGE UP (ref 0–0.9)
IMM GRANULOCYTES NFR BLD AUTO: 0.2 % — SIGNIFICANT CHANGE UP (ref 0–0.9)
LYMPHOCYTES # BLD AUTO: 1.71 K/UL — SIGNIFICANT CHANGE UP (ref 1–3.3)
LYMPHOCYTES # BLD AUTO: 1.71 K/UL — SIGNIFICANT CHANGE UP (ref 1–3.3)
LYMPHOCYTES # BLD AUTO: 17.7 % — SIGNIFICANT CHANGE UP (ref 13–44)
LYMPHOCYTES # BLD AUTO: 17.7 % — SIGNIFICANT CHANGE UP (ref 13–44)
MCHC RBC-ENTMCNC: 29.3 PG — SIGNIFICANT CHANGE UP (ref 27–34)
MCHC RBC-ENTMCNC: 29.3 PG — SIGNIFICANT CHANGE UP (ref 27–34)
MCHC RBC-ENTMCNC: 32.5 GM/DL — SIGNIFICANT CHANGE UP (ref 32–36)
MCHC RBC-ENTMCNC: 32.5 GM/DL — SIGNIFICANT CHANGE UP (ref 32–36)
MCV RBC AUTO: 90.3 FL — SIGNIFICANT CHANGE UP (ref 80–100)
MCV RBC AUTO: 90.3 FL — SIGNIFICANT CHANGE UP (ref 80–100)
MONOCYTES # BLD AUTO: 0.55 K/UL — SIGNIFICANT CHANGE UP (ref 0–0.9)
MONOCYTES # BLD AUTO: 0.55 K/UL — SIGNIFICANT CHANGE UP (ref 0–0.9)
MONOCYTES NFR BLD AUTO: 5.7 % — SIGNIFICANT CHANGE UP (ref 2–14)
MONOCYTES NFR BLD AUTO: 5.7 % — SIGNIFICANT CHANGE UP (ref 2–14)
NEUTROPHILS # BLD AUTO: 7.27 K/UL — SIGNIFICANT CHANGE UP (ref 1.8–7.4)
NEUTROPHILS # BLD AUTO: 7.27 K/UL — SIGNIFICANT CHANGE UP (ref 1.8–7.4)
NEUTROPHILS NFR BLD AUTO: 75.3 % — SIGNIFICANT CHANGE UP (ref 43–77)
NEUTROPHILS NFR BLD AUTO: 75.3 % — SIGNIFICANT CHANGE UP (ref 43–77)
NRBC # BLD: 0 /100 WBCS — SIGNIFICANT CHANGE UP (ref 0–0)
NRBC # BLD: 0 /100 WBCS — SIGNIFICANT CHANGE UP (ref 0–0)
PLATELET # BLD AUTO: 248 K/UL — SIGNIFICANT CHANGE UP (ref 150–400)
PLATELET # BLD AUTO: 248 K/UL — SIGNIFICANT CHANGE UP (ref 150–400)
POTASSIUM SERPL-MCNC: 4.3 MMOL/L — SIGNIFICANT CHANGE UP (ref 3.5–5.3)
POTASSIUM SERPL-MCNC: 4.3 MMOL/L — SIGNIFICANT CHANGE UP (ref 3.5–5.3)
POTASSIUM SERPL-SCNC: 4.3 MMOL/L — SIGNIFICANT CHANGE UP (ref 3.5–5.3)
POTASSIUM SERPL-SCNC: 4.3 MMOL/L — SIGNIFICANT CHANGE UP (ref 3.5–5.3)
PROT SERPL-MCNC: 6.8 G/DL — SIGNIFICANT CHANGE UP (ref 6–8.3)
PROT SERPL-MCNC: 6.8 G/DL — SIGNIFICANT CHANGE UP (ref 6–8.3)
RBC # BLD: 4.33 M/UL — SIGNIFICANT CHANGE UP (ref 3.8–5.2)
RBC # BLD: 4.33 M/UL — SIGNIFICANT CHANGE UP (ref 3.8–5.2)
RBC # FLD: 12.7 % — SIGNIFICANT CHANGE UP (ref 10.3–14.5)
RBC # FLD: 12.7 % — SIGNIFICANT CHANGE UP (ref 10.3–14.5)
SODIUM SERPL-SCNC: 142 MMOL/L — SIGNIFICANT CHANGE UP (ref 135–145)
SODIUM SERPL-SCNC: 142 MMOL/L — SIGNIFICANT CHANGE UP (ref 135–145)
WBC # BLD: 9.66 K/UL — SIGNIFICANT CHANGE UP (ref 3.8–10.5)
WBC # BLD: 9.66 K/UL — SIGNIFICANT CHANGE UP (ref 3.8–10.5)
WBC # FLD AUTO: 9.66 K/UL — SIGNIFICANT CHANGE UP (ref 3.8–10.5)
WBC # FLD AUTO: 9.66 K/UL — SIGNIFICANT CHANGE UP (ref 3.8–10.5)

## 2024-01-09 PROCEDURE — 80053 COMPREHEN METABOLIC PANEL: CPT

## 2024-01-09 PROCEDURE — 85025 COMPLETE CBC W/AUTO DIFF WBC: CPT

## 2024-01-09 PROCEDURE — 93010 ELECTROCARDIOGRAM REPORT: CPT

## 2024-01-09 PROCEDURE — 99284 EMERGENCY DEPT VISIT MOD MDM: CPT

## 2024-01-09 PROCEDURE — 99283 EMERGENCY DEPT VISIT LOW MDM: CPT

## 2024-01-09 PROCEDURE — 93005 ELECTROCARDIOGRAM TRACING: CPT

## 2024-01-09 PROCEDURE — 36415 COLL VENOUS BLD VENIPUNCTURE: CPT

## 2024-01-09 RX ORDER — SODIUM CHLORIDE 9 MG/ML
1000 INJECTION INTRAMUSCULAR; INTRAVENOUS; SUBCUTANEOUS ONCE
Refills: 0 | Status: DISCONTINUED | OUTPATIENT
Start: 2024-01-09 | End: 2024-01-13

## 2024-01-09 RX ORDER — ONDANSETRON 8 MG/1
4 TABLET, FILM COATED ORAL ONCE
Refills: 0 | Status: DISCONTINUED | OUTPATIENT
Start: 2024-01-09 | End: 2024-01-09

## 2024-01-09 NOTE — ED ADULT NURSE NOTE - CHIEF COMPLAINT QUOTE
no No complaints Patient BIBA complaining of dizziness and nausea, recently finished 5days of paxlovid, taken zofran prior to arrival

## 2024-01-09 NOTE — ED ADULT NURSE NOTE - OBJECTIVE STATEMENT
Patient received complaining of dizziness recently finished regiment of paxlovid, Patient is AOx4, safety precautions in place, awaiting evaluation

## 2024-01-09 NOTE — ED ADULT TRIAGE NOTE - WEIGHT METHOD
stated Spironolactone Counseling: Patient advised regarding risks of diarrhea, abdominal pain, hyperkalemia, birth defects (for female patients), liver toxicity and renal toxicity. The patient may need blood work to monitor liver and kidney function and potassium levels while on therapy. The patient verbalized understanding of the proper use and possible adverse effects of spironolactone.  All of the patient's questions and concerns were addressed.

## 2024-01-09 NOTE — ED PROVIDER NOTE - NSICDXPASTMEDICALHX_GEN_ALL_CORE_FT
PAST MEDICAL HISTORY:  Colon polyp     High cholesterol     MR (mitral regurgitation)     MVP (mitral valve prolapse)     Pleurisy     RBBB     Sciatica     Vaginal polyp

## 2024-01-09 NOTE — ED PROVIDER NOTE - DIFFERENTIAL DIAGNOSIS
ddx considered but not limited to dehydration, near syncope, vertigo, electrolyte abnormality Differential Diagnosis

## 2024-01-09 NOTE — ED ADULT NURSE NOTE - NSFALLUNIVINTERV_ED_ALL_ED
Bed/Stretcher in lowest position, wheels locked, appropriate side rails in place/Call bell, personal items and telephone in reach/Instruct patient to call for assistance before getting out of bed/chair/stretcher/Non-slip footwear applied when patient is off stretcher/[place] to call system/Physically safe environment - no spills, clutter or unnecessary equipment/Purposeful proactive rounding/Room/bathroom lighting operational, light cord in reach Bed/Stretcher in lowest position, wheels locked, appropriate side rails in place/Call bell, personal items and telephone in reach/Instruct patient to call for assistance before getting out of bed/chair/stretcher/Non-slip footwear applied when patient is off stretcher/[place] to call system/Physically safe environment - no spills, clutter or unnecessary equipment/Purposeful proactive rounding/Room/bathroom lighting operational, light cord in reach

## 2024-01-09 NOTE — ED PROVIDER NOTE - PROGRESS NOTE DETAILS
pt reevaluated, feeling better, symptoms have resolved, most likely pt had side effect from paxlovid, follow up as needed, return if any smptoms worsen

## 2024-01-09 NOTE — ED PROVIDER NOTE - OBJECTIVE STATEMENT
77yo female bib ems s/p dizzinss near syncopal episode. pt was given paxlovid for covid 5 days ago, and today woke up with dizziness and feeling like she was going to pass out, weakness in arms and legs, with photophobia, no vomiting, pt felt nauseous and got zofran by  the  nausea subsided, no chest pain sob, no fever, chills, pt states she does feel better after fluids, and denies hxof vertigo or migraines 79yo female bib ems s/p dizzinss near syncopal episode. pt was given paxlovid for covid 5 days ago, and today woke up with dizziness and feeling like she was going to pass out, weakness in arms and legs, with photophobia, no vomiting, pt felt nauseous and got zofran by  the  nausea subsided, no chest pain sob, no fever, chills, pt states she does feel better after fluids, and denies hxof vertigo or migraines

## 2024-01-09 NOTE — ED ADULT TRIAGE NOTE - CHIEF COMPLAINT QUOTE
Patient BIBA complaining of dizziness and nausea, recently finished 5days of paxlovid, taken zofran prior to arrival

## 2024-01-09 NOTE — ED PROVIDER NOTE - NSICDXFAMILYHX_GEN_ALL_CORE_FT
FAMILY HISTORY:  Sibling  Still living? Unknown  Family history of multiple myeloma, Age at diagnosis: Age Unknown

## 2024-01-09 NOTE — ED ADULT NURSE NOTE - CAS TRG GENERAL NORM CIRC DET
Strong peripheral pulses Rhomboid Transposition Flap Text: The defect edges were debeveled with a #15 scalpel blade.  Given the location of the defect and the proximity to free margins a rhomboid transposition flap was deemed most appropriate.  Using a sterile surgical marker, an appropriate rhomboid flap was drawn incorporating the defect.    The area thus outlined was incised deep to adipose tissue with a #15 scalpel blade.  The skin margins were undermined to an appropriate distance in all directions utilizing iris scissors.

## 2024-01-09 NOTE — ED PROVIDER NOTE - PATIENT PORTAL LINK FT
You can access the FollowMyHealth Patient Portal offered by [facility] by registering at the following website: [email or web address]. By joining [employer]’s FollowMyHealth portal, you will also be able to view your health information using other applications (apps) compatible with our system. You can access the FollowMyHealth Patient Portal offered by [facility] by registering at the following website: [email or web address]. By joining [employer]’s FollowMyHealth portal, you will also be able to view your health information using other applications (apps) compatible with our system.

## 2024-01-09 NOTE — ED ADULT NURSE NOTE - NSFALLLASTSIX_ED_ALL_ED
You can go to [employer] com/locations and see if there is a place that will do the mri on the weekend.  If there is send me a message and I will change the order    Take the new dose of keppra and lamictal  Get your levels checked next week No.

## 2024-03-24 ENCOUNTER — EMERGENCY (EMERGENCY)
Facility: HOSPITAL | Age: 79
LOS: 1 days | Discharge: HOME | End: 2024-03-24
Attending: EMERGENCY MEDICINE | Admitting: EMERGENCY MEDICINE
Payer: COMMERCIAL

## 2024-03-24 VITALS
DIASTOLIC BLOOD PRESSURE: 98 MMHG | HEIGHT: 66 IN | WEIGHT: 141.98 LBS | HEART RATE: 66 BPM | TEMPERATURE: 97 F | OXYGEN SATURATION: 100 % | RESPIRATION RATE: 16 BRPM | SYSTOLIC BLOOD PRESSURE: 179 MMHG

## 2024-03-24 VITALS
RESPIRATION RATE: 17 BRPM | OXYGEN SATURATION: 99 % | HEART RATE: 72 BPM | SYSTOLIC BLOOD PRESSURE: 139 MMHG | TEMPERATURE: 98 F | DIASTOLIC BLOOD PRESSURE: 75 MMHG

## 2024-03-24 DIAGNOSIS — Z98.890 OTHER SPECIFIED POSTPROCEDURAL STATES: Chronic | ICD-10-CM

## 2024-03-24 DIAGNOSIS — Z90.89 ACQUIRED ABSENCE OF OTHER ORGANS: Chronic | ICD-10-CM

## 2024-03-24 LAB
ALBUMIN SERPL ELPH-MCNC: 3.7 G/DL — SIGNIFICANT CHANGE UP (ref 3.3–5)
ALP SERPL-CCNC: 84 U/L — SIGNIFICANT CHANGE UP (ref 40–120)
ALT FLD-CCNC: 25 U/L — SIGNIFICANT CHANGE UP (ref 12–78)
ANION GAP SERPL CALC-SCNC: 7 MMOL/L — SIGNIFICANT CHANGE UP (ref 5–17)
APTT BLD: 33.8 SEC — SIGNIFICANT CHANGE UP (ref 24.5–35.6)
AST SERPL-CCNC: 22 U/L — SIGNIFICANT CHANGE UP (ref 15–37)
BASOPHILS # BLD AUTO: 0.04 K/UL — SIGNIFICANT CHANGE UP (ref 0–0.2)
BASOPHILS NFR BLD AUTO: 0.5 % — SIGNIFICANT CHANGE UP (ref 0–2)
BILIRUB SERPL-MCNC: 0.2 MG/DL — SIGNIFICANT CHANGE UP (ref 0.2–1.2)
BUN SERPL-MCNC: 27 MG/DL — HIGH (ref 7–23)
CALCIUM SERPL-MCNC: 9.5 MG/DL — SIGNIFICANT CHANGE UP (ref 8.5–10.1)
CHLORIDE SERPL-SCNC: 112 MMOL/L — HIGH (ref 96–108)
CO2 SERPL-SCNC: 28 MMOL/L — SIGNIFICANT CHANGE UP (ref 22–31)
CREAT SERPL-MCNC: 0.7 MG/DL — SIGNIFICANT CHANGE UP (ref 0.5–1.3)
EGFR: 87 ML/MIN/1.73M2 — SIGNIFICANT CHANGE UP
EOSINOPHIL # BLD AUTO: 0.22 K/UL — SIGNIFICANT CHANGE UP (ref 0–0.5)
EOSINOPHIL NFR BLD AUTO: 3 % — SIGNIFICANT CHANGE UP (ref 0–6)
GLUCOSE SERPL-MCNC: 132 MG/DL — HIGH (ref 70–99)
HCT VFR BLD CALC: 42.3 % — SIGNIFICANT CHANGE UP (ref 34.5–45)
HGB BLD-MCNC: 14.2 G/DL — SIGNIFICANT CHANGE UP (ref 11.5–15.5)
IMM GRANULOCYTES NFR BLD AUTO: 0.1 % — SIGNIFICANT CHANGE UP (ref 0–0.9)
INR BLD: 0.86 RATIO — SIGNIFICANT CHANGE UP (ref 0.85–1.18)
LIDOCAIN IGE QN: 42 U/L — SIGNIFICANT CHANGE UP (ref 13–75)
LYMPHOCYTES # BLD AUTO: 2.75 K/UL — SIGNIFICANT CHANGE UP (ref 1–3.3)
LYMPHOCYTES # BLD AUTO: 37.4 % — SIGNIFICANT CHANGE UP (ref 13–44)
MCHC RBC-ENTMCNC: 30.2 PG — SIGNIFICANT CHANGE UP (ref 27–34)
MCHC RBC-ENTMCNC: 33.6 GM/DL — SIGNIFICANT CHANGE UP (ref 32–36)
MCV RBC AUTO: 90 FL — SIGNIFICANT CHANGE UP (ref 80–100)
MONOCYTES # BLD AUTO: 0.57 K/UL — SIGNIFICANT CHANGE UP (ref 0–0.9)
MONOCYTES NFR BLD AUTO: 7.8 % — SIGNIFICANT CHANGE UP (ref 2–14)
NEUTROPHILS # BLD AUTO: 3.76 K/UL — SIGNIFICANT CHANGE UP (ref 1.8–7.4)
NEUTROPHILS NFR BLD AUTO: 51.2 % — SIGNIFICANT CHANGE UP (ref 43–77)
NRBC # BLD: 0 /100 WBCS — SIGNIFICANT CHANGE UP (ref 0–0)
PLATELET # BLD AUTO: 223 K/UL — SIGNIFICANT CHANGE UP (ref 150–400)
POTASSIUM SERPL-MCNC: 3.7 MMOL/L — SIGNIFICANT CHANGE UP (ref 3.5–5.3)
POTASSIUM SERPL-SCNC: 3.7 MMOL/L — SIGNIFICANT CHANGE UP (ref 3.5–5.3)
PROT SERPL-MCNC: 7.7 G/DL — SIGNIFICANT CHANGE UP (ref 6–8.3)
PROTHROM AB SERPL-ACNC: 10.1 SEC — SIGNIFICANT CHANGE UP (ref 9.5–13)
RBC # BLD: 4.7 M/UL — SIGNIFICANT CHANGE UP (ref 3.8–5.2)
RBC # FLD: 12.8 % — SIGNIFICANT CHANGE UP (ref 10.3–14.5)
SODIUM SERPL-SCNC: 147 MMOL/L — HIGH (ref 135–145)
TROPONIN I, HIGH SENSITIVITY RESULT: 28.4 NG/L — SIGNIFICANT CHANGE UP
WBC # BLD: 7.35 K/UL — SIGNIFICANT CHANGE UP (ref 3.8–10.5)
WBC # FLD AUTO: 7.35 K/UL — SIGNIFICANT CHANGE UP (ref 3.8–10.5)

## 2024-03-24 PROCEDURE — 80053 COMPREHEN METABOLIC PANEL: CPT

## 2024-03-24 PROCEDURE — 99285 EMERGENCY DEPT VISIT HI MDM: CPT

## 2024-03-24 PROCEDURE — 93010 ELECTROCARDIOGRAM REPORT: CPT

## 2024-03-24 PROCEDURE — 85610 PROTHROMBIN TIME: CPT

## 2024-03-24 PROCEDURE — 83690 ASSAY OF LIPASE: CPT

## 2024-03-24 PROCEDURE — 84484 ASSAY OF TROPONIN QUANT: CPT

## 2024-03-24 PROCEDURE — 70450 CT HEAD/BRAIN W/O DYE: CPT | Mod: 26,MC

## 2024-03-24 PROCEDURE — 99285 EMERGENCY DEPT VISIT HI MDM: CPT | Mod: 25

## 2024-03-24 PROCEDURE — 85730 THROMBOPLASTIN TIME PARTIAL: CPT

## 2024-03-24 PROCEDURE — 85025 COMPLETE CBC W/AUTO DIFF WBC: CPT

## 2024-03-24 PROCEDURE — 36415 COLL VENOUS BLD VENIPUNCTURE: CPT

## 2024-03-24 PROCEDURE — 93005 ELECTROCARDIOGRAM TRACING: CPT

## 2024-03-24 PROCEDURE — 70450 CT HEAD/BRAIN W/O DYE: CPT | Mod: MC

## 2024-03-24 RX ORDER — MECLIZINE HCL 12.5 MG
25 TABLET ORAL ONCE
Refills: 0 | Status: COMPLETED | OUTPATIENT
Start: 2024-03-24 | End: 2024-03-24

## 2024-03-24 RX ORDER — ONDANSETRON 8 MG/1
4 TABLET, FILM COATED ORAL ONCE
Refills: 0 | Status: COMPLETED | OUTPATIENT
Start: 2024-03-24 | End: 2024-03-24

## 2024-03-24 RX ORDER — SODIUM CHLORIDE 9 MG/ML
1000 INJECTION INTRAMUSCULAR; INTRAVENOUS; SUBCUTANEOUS ONCE
Refills: 0 | Status: COMPLETED | OUTPATIENT
Start: 2024-03-24 | End: 2024-03-24

## 2024-03-24 RX ORDER — MECLIZINE HCL 12.5 MG
1 TABLET ORAL
Qty: 21 | Refills: 0
Start: 2024-03-24 | End: 2024-03-30

## 2024-03-24 RX ADMIN — SODIUM CHLORIDE 1000 MILLILITER(S): 9 INJECTION INTRAMUSCULAR; INTRAVENOUS; SUBCUTANEOUS at 04:02

## 2024-03-24 RX ADMIN — Medication 25 MILLIGRAM(S): at 04:03

## 2024-03-24 NOTE — ED PROVIDER NOTE - OBJECTIVE STATEMENT
78-year-old female history of high cholesterol mitral regurgitation mitral valve prolapse complaining of sudden onset of dizziness room spinning sensation around 1:00 while getting up to go to the bathroom.  Admits to some initial nausea no vomiting.  Worse with head movement.  Better when her eyes are closed.  Admits to feeling this in the past but not as bad.

## 2024-03-24 NOTE — ED PROVIDER NOTE - CLINICAL SUMMARY MEDICAL DECISION MAKING FREE TEXT BOX
78-year-old female history of high cholesterol mitral regurgitation mitral valve prolapse complaining of sudden onset of dizziness room spinning sensation around 1:00 while getting up to go to the bathroom.  Admits to some initial nausea no vomiting.  Worse with head movement.  Better when her eyes are closed.  Admits to feeling this in the past but not as bad.    r/o central vs peripheral vertigo, labs, meclizine, zofran, CT head

## 2024-03-24 NOTE — ED PROVIDER NOTE - PRO INTERPRETER NEED 2
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  g/dL Final   • Total Bilirubin 12/14/2022 <0.2  0.0 - 1.2 mg/dL Final   • Alkaline Phosphatase 12/14/2022 135 (H)  39 - 117 U/L Final   • AST (SGOT) 12/14/2022 19  1 - 32 U/L Final   • ALT (SGPT) 12/14/2022 23  1 - 33 U/L Final   • Vitamin B-12 12/14/2022 430  211 - 946 pg/mL Final    Results may be falsely increased if patient taking Biotin.   • Folate 12/14/2022 13.90  4.78 - 24.20 ng/mL Final    Results may be falsely increased if patient taking Biotin.   • 25 Hydroxy, Vitamin D 12/14/2022 43.4  30.0 - 100.0 ng/ml Final    Comment: Results may be falsely increased if patient taking Biotin.  Reference Range for Total Vitamin D 25(OH)  Deficiency <20.0 ng/mL  Insufficiency 21-29 ng/mL  Sufficiency  ng/mL  Toxicity >100 ng/ml     • C-Reactive Protein 12/14/2022 1.89 (H)  0.00 - 0.50 mg/dL Final   • Sed Rate 12/14/2022 20  0 - 30 mm/hr Final   • RA Latex Turbid 12/14/2022 <10.0  <14.0 IU/mL Final   • CCP Antibodies IgG/IgA 12/14/2022 1  0 - 19 units Final    Comment:                           Negative               <20                            Weak positive      20 - 39                            Moderate positive  40 - 59                            Strong positive        >59     • DIPAK 12/14/2022 Positive (A)   Final    Comment:                                      Negative   <1:80                                       Borderline  1:80                                        Positive   >1:80     • Please note 12/14/2022 Comment   Final    Comment: DIPAK Multiplex methodology was designed to detect up to 11 antibodies  of the 100+ antibodies that may be detected by DIPAK IFA methodology.     • Speckled Pattern 12/14/2022 1:160 (H)   Final    ICAP nomenclature: AC-2,4,5,29   • Note: (Reference) 12/14/2022 Comment   Final    Comment: For more information about Hep-2 cell patterns use  ANApatterns.org, the official website for the International  Consensus on Antinuclear Antibody (DIPAK) Patterns (ICAP).  ------------------------------------------------------------  A positive DIPAK result may occur in healthy individuals (low  titer) or be associated with a variety of diseases.  See  interpretation chart which is not all inclusive:  Pattern      Antigen Detected  Suggested Disease Association  -----------  ----------------  -----------------------------  Homogeneous  DNA(ds,ss),       SLE - High titers               Nucleosomes,               Histones          Drug-induced SLE  -----------  ----------------  -----------------------------  Speckled     Sm, RNP, SCL-70,  SLE,MCTD,PSS (diffuse form),               SS-A/SS-B         Sjogrens  -----------  ----------------  -----------------------------  Nucleolar    SCL-70, PM-1/SCL  High titers Scleroderma,                                 PM/DM  -----------  --------------                           --  -----------------------------  Centromere   Centromere        PSS (limited form) w/Crest                                 syndrome variable  -----------  ----------------  -----------------------------  Nuclear Dot  [id number]  Primary Biliary Cirrhosis  -----------  ----------------  -----------------------------  Nuclear      ,            Primary Biliary Cirrhosis  Membrane     adan A,B,C  -----------  ----------------  -----------------------------     • Anti-DNA (DS) Ab Qn 12/14/2022 1  0 - 9 IU/mL Final    Comment:                                     Negative      <5                                     Equivocal  5 - 9                                     Positive      >9     • RNP Antibodies 2022 0.2  0.0 - 0.9 AI Final   • Oates Antibodies 2022 <0.2  0.0 - 0.9 AI Final   • Antiscleroderma-70 Antibodies 2022 <0.2  0.0 - 0.9 AI Final   • CHRIS SSA (RO) Ab 2022 >8.0 (H)  0.0 - 0.9 AI Final   • CHRIS SSB (LA) Ab 2022 <0.2  0.0 - 0.9 AI Final   • Antichromatin Antibodies 2022 <0.2  0.0 - 0.9 AI Final   • ODELL-1 IgG 2022 <0.2  0.0 - 0.9 AI Final   • Anti-Centromere B Antibodies 2022 <0.2  0.0 - 0.9 AI Final   • See below: 2022 Comment   Final    Comment: Autoantibody                       Disease Association  ------------------------------------------------------------                          Condition                  Frequency  ---------------------   ------------------------   ---------  Antinuclear Antibody,    SLE, mixed connective  Direct (DIPAK-D)           tissue diseases  ---------------------   ------------------------   ---------  dsDNA                    SLE                        40 - 60%  ---------------------   ------------------------   ---------  Chromatin                Drug induced SLE                90%                           SLE                        48 - 97%  ---------------------   ------------------------   ---------  SSA (Ro)                 SLE                        25 - 35%                           Sjogren's Syndrome         40 - 70%                            Lupus                 100%  ---------------------   ------------------------   ---------  SSB (La)                 SLE                                                        10%                           Sjogren's Syndrome              30%  ---------------------   -----------------------    ---------  Sm (anti-Smith)          SLE                        15 - 30%  ---------------------    -----------------------    ---------  RNP                      Mixed Connective Tissue                           Disease                         95%  (U1 nRNP,                SLE                        30 - 50%  anti-ribonucleoprotein)  Polymyositis and/or                           Dermatomyositis                 20%  ---------------------   ------------------------   ---------  Scl-70 (antiDNA          Scleroderma (diffuse)      20 - 35%  topoisomerase)           Crest                           13%  ---------------------   ------------------------   ---------  Nithya-1                     Polymyositis and/or                           Dermatomyositis            20 - 40%  ---------------------   ------------------------   ---------  Centromere B             Scleroderma -                            Crest                           variant                         80%     Admission on 11/02/2022, Discharged on 11/02/2022   Component Date Value Ref Range Status   • SARS Antigen 11/02/2022 Not Detected   Final   • Internal Control 11/02/2022 Passed   Final   • Lot Number 11/02/2022 [id number]   Final   • Expiration Date 11/02/2022 05/30/2023   Final   • Rapid Influenza A Ag 11/02/2022 Negative   Final   • Rapid Influenza B Ag 11/02/2022 Negative   Final   • Internal Control 11/02/2022 Passed   Final   • Lot Number 11/02/2022 [id number]   Final   • Expiration Date 11/02/2022 07/31/2024   Final   • SARS-CoV-2, LAST 11/02/2022 Not Detected  Not Detected Final    Comment: This nucleic acid amplification test was developed and its performance  characteristics determined by [employer]. Nucleic acid  amplification tests include RT-PCR and TMA. This test has not been  FDA cleared or approved. This test has been authorized by FDA under  an Emergency Use Authorization (EUA). This test is only authorized  for the duration of time the declaration that circumstances exist  justifying the authorization of the emergency use of in  vitro  diagnostic tests for detection of SARS-CoV-2 virus and/or diagnosis  of COVID-19 infection under section 564(b)(1) of the Act, 21 U.S.C.  360bbb-3(b) (1), unless the authorization is terminated or revoked  sooner.  When diagnostic testing is negative, the possibility of a false  negative result should be considered in the context of a patient's  recent exposures and the presence of clinical signs and symptoms  consistent with COVID-19. An individual without symptoms of COVID-19  and who is not shedding SARS-CoV-2 virus wo                           uld expect to have a  negative (not detected) result in this assay.     • [facility] SARS-COV-2, LAST 2 DAY TAT 11/02/2022 Performed   Final   • COVID [facility] PRIORITY 11/02/2022 Comment   Final    Received   Admission on 11/01/2022, Discharged on 11/01/2022   Component Date Value Ref Range Status   • QT Interval 11/01/2022 325  ms Final   • Glucose 11/01/2022 191 (H)  65 - 99 mg/dL Final   • BUN 11/01/2022 11  6 - 20 mg/dL Final   • Creatinine 11/01/2022 0.80  0.57 - 1.00 mg/dL Final   • Sodium 11/01/2022 139  136 - 145 mmol/L Final   • Potassium 11/01/2022 4.0  3.5 - 5.2 mmol/L Final   • Chloride 11/01/2022 99  98 - 107 mmol/L Final   • CO2 11/01/2022 22.2  22.0 - 29.0 mmol/L Final   • Calcium 11/01/2022 9.7  8.6 - 10.5 mg/dL Final   • Total Protein 11/01/2022 7.0  6.0 - 8.5 g/dL Final   • Albumin 11/01/2022 4.10  3.50 - 5.20 g/dL Final   • ALT (SGPT) 11/01/2022 20  1 - 33 U/L Final   • AST (SGOT) 11/01/2022 29  1 - 32 U/L Final   • Alkaline Phosphatase 11/01/2022 134 (H)  39 - 117 U/L Final   • Total Bilirubin 11/01/2022 <0.2  0.0 - 1.2 mg/dL Final   • Globulin 11/01/2022 2.9  gm/dL Final   • A/G Ratio 11/01/2022 1.4  g/dL Final   • BUN/Creatinine Ratio 11/01/2022 13.8  7.0 - 25.0 Final   • Anion Gap 11/01/2022 17.8 (H)  5.0 - 15.0 mmol/L Final   • eGFR 11/01/2022 87.7  >60.0 mL/min/1.73 Final    National Kidney Foundation and American Society of Nephrology (ASN)  Task Force recommended calculation based on the Chronic Kidney Disease Epidemiology Collaboration (CKD-EPI) equation refit without adjustment for race.   • Troponin T 11/01/2022 <0.010  0.000 - 0.030 ng/mL Final   • Troponin T 11/01/2022 <0.010  0.000 - 0.030 ng/mL Final   • Extra Tube 11/01/2022 Hold for add-ons.   Final    Auto resulted.   • Extra Tube 11/01/2022 hold for add-on   Final    Auto resulted   • Extra Tube 11/01/2022 Hold for add-ons.   Final    Auto resulted.   • Extra Tube 11/01/2022 Hold for add-ons.   Final    Auto resulted   • WBC 11/01/2022 9.51  3.40 - 10.80 10*3/mm3 Final   • RBC 11/01/2022 4.86  3.77 - 5.28 10*6/mm3 Final   • Hemoglobin 11/01/2022 13.0  12.0 - 15.9 g/dL Final   • Hematocrit 11/01/2022 40.1  34.0 - 46.6 % Final   • MCV 11/01/2022 82.5  79.0 - 97.0 fL Final   • MCH 11/01/2022 26.7  26.6 - 33.0 pg Final   • MCHC 11/01/2022 32.4  31.5 - 35.7 g/dL Final   • RDW 11/01/2022 13.6  12.3 - 15.4 % Final   • RDW-SD 11/01/2022 40.3  37.0 - 54.0 fl Final   • MPV 11/01/2022 9.0  6.0 - 12.0 fL Final   • Platelets 11/01/2022 297  140 - 450 10*3/mm3 Final   • Neutrophil % 11/01/2022 59.0  42.7 - 76.0 % Final   • Lymphocyte % 11/01/2022 32.1  19.6 - 45.3 % Final   • Monocyte % 11/01/2022 5.8  5.0 - 12.0 % Final   • Eosinophil % 11/01/2022 2.4  0.3 - 6.2 % Final   • Basophil % 11/01/2022 0.4  0.0 - 1.5 % Final   • Immature Grans % 11/01/2022 0.3  0.0 - 0.5 % Final   • Neutrophils, Absolute 11/01/2022 5.61  1.70 - 7.00 10*3/mm3 Final   • Lymphocytes, Absolute 11/01/2022 3.05  0.70 - 3.10 10*3/mm3 Final   • Monocytes, Absolute 11/01/2022 0.55  0.10 - 0.90 10*3/mm3 Final   • Eosinophils, Absolute 11/01/2022 0.23  0.00 - 0.40 10*3/mm3 Final   • Basophils, Absolute 11/01/2022 0.04  0.00 - 0.20 10*3/mm3 Final   • Immature Grans, Absolute 11/01/2022 0.03  0.00 - 0.05 10*3/mm3 Final   • nRBC 11/01/2022 0.0  0.0 - 0.2 /100 WBC Final   • D-Dimer, Quantitative 11/01/2022 0.30  0.00 - 0.49 MCGFEU/mL Final    • Target HR (85%) 11/01/2022 141  bpm Final   • Max. Pred. HR (100%) 11/01/2022 166  bpm Final   Hospital Outpatient Visit on 10/18/2022   Component Date Value Ref Range Status   • Target HR (85%) 10/18/2022 141  bpm Final   • Max. Pred. HR (100%) 10/18/2022 166  bpm Final   • EF(MOD-bp) 10/18/2022 64.5  % Final   • LVIDd 10/18/2022 4.8  cm Final   • LVIDs 10/18/2022 3.4  cm Final   • IVSd 10/18/2022 0.96  cm Final   • LVPWd 10/18/2022 0.93  cm Final   • FS 10/18/2022 29.9  % Final   • IVS/LVPW 10/18/2022 1.04  cm Final   • ESV(cubed) 10/18/2022 38.4  ml Final   • LV Sys Vol (BSA corrected) 10/18/2022 13.1  cm2 Final   • EDV(cubed) 10/18/2022 111.2  ml Final   • LV Farley Vol (BSA corrected) 10/18/2022 36.5  cm2 Final   • LVOT area 10/18/2022 2.8  cm2 Final   • LV mass(C)d 10/18/2022 158.5  grams Final   • LVOT diam 10/18/2022 1.89  cm Final   • EDV(MOD-sp2) 10/18/2022 71.0  ml Final   • EDV(MOD-sp4) 10/18/2022 78.0  ml Final   • ESV(MOD-sp2) 10/18/2022 25.0  ml Final   • ESV(MOD-sp4) 10/18/2022 28.0  ml Final   • SV(MOD-sp2) 10/18/2022 46.0  ml Final   • SV(MOD-sp4) 10/18/2022 50.0  ml Final   • SI(MOD-sp2) 10/18/2022 21.5  ml/m2 Final   • SI(MOD-sp4) 10/18/2022 23.4  ml/m2 Final   • EF(MOD-sp2) 10/18/2022 64.8  % Final   • EF(MOD-sp4) 10/18/2022 64.1  % Final   • MV E max allan 10/18/2022 84.6  cm/sec Final   • MV A max allan 10/18/2022 82.5  cm/sec Final   • MV dec time 10/18/2022 0.21  msec Final   • MV E/A 10/18/2022 1.03   Final   • Pulm A Revs Dur 10/18/2022 0.08  sec Final   • MV A dur 10/18/2022 0.08  sec Final   • LA ESV Index (BP) 10/18/2022 18.9  ml/m2 Final   • Med Peak E' Allan 10/18/2022 8.3  cm/sec Final   • Lat Peak E' Allan 10/18/2022 11.1  cm/sec Final   • Avg E/e' ratio 10/18/2022 8.72   Final   • SV(LVOT) 10/18/2022 70.9  ml Final   • SV(RVOT) 10/18/2022 52.7  ml Final   • Qp/Qs 10/18/2022 0.74   Final   • RV Base 10/18/2022 2.48  cm Final   • RV Mid 10/18/2022 2.6  cm Final   • RV Length 10/18/2022 8.1   cm Final   • TAPSE (>1.6) 10/18/2022 2.09  cm Final   • RV S' 10/18/2022 10.7  cm/sec Final   • Pulm Sys Allan 10/18/2022 49.0  cm/sec Final   • Pulm Farley Allan 10/18/2022 48.6  cm/sec Final   • Pulm S/D 10/18/2022 1.01   Final   • Pulm A Revs Allan 10/18/2022 50.9  cm/sec Final   • LV V1 max 10/18/2022 132.8  cm/sec Final   • LV V1 max PG 10/18/2022 7.1  mmHg Final   • LV V1 mean PG 10/18/2022 4.1  mmHg Final   • LV V1 VTI 10/18/2022 25.2  cm Final   • Ao pk allan 10/18/2022 143.5  cm/sec Final   • Ao max PG 10/18/2022 8.2  mmHg Final   • Ao mean PG 10/18/2022 5.3  mmHg Final   • Ao V2 VTI 10/18/2022 25.4  cm Final   • RAI(I,D) 10/18/2022 2.8  cm2 Final   • MV max PG 10/18/2022 3.8  mmHg Final   • MV mean PG 10/18/2022 2.47  mmHg Final   • MV V2 VTI 10/18/2022 24.6  cm Final   • MV P1/2t 10/18/2022 42.5  msec Final   • MVA(P1/2t) 10/18/2022 5.2  cm2 Final   • MVA(VTI) 10/18/2022 2.9  cm2 Final   • MV dec slope 10/18/2022 634.2  cm/sec2 Final   • TR max allan 10/18/2022 213.1  cm/sec Final   • TR max PG 10/18/2022 18.2  mmHg Final   • RVSP(TR) 10/18/2022 21.2  mmHg Final   • RAP systole 10/18/2022 3.0  mmHg Final   • RVOT diam 10/18/2022 1.99  cm Final   • RV V1 max PG 10/18/2022 3.1  mmHg Final   • RV V1 max 10/18/2022 88.3  cm/sec Final   • RV V1 VTI 10/18/2022 17.0  cm Final   • PA V2 max 10/18/2022 114.0  cm/sec Final   • PA acc time 10/18/2022 0.08  sec Final   • PA pr(Accel) 10/18/2022 42.2  mmHg Final   • Ao root diam 10/18/2022 3.1  cm Final   • ACS 10/18/2022 2.6  cm Final   • Sinus 10/18/2022 2.7  cm Final   • STJ 10/18/2022 3.4  cm Final   • Ascending aorta 10/18/2022 3.2  cm Final   • Aortic arch 10/18/2022 1.3  cm Final   Admission on 10/04/2022, Discharged on 10/04/2022   Component Date Value Ref Range Status   • Color 10/04/2022 Yellow  Yellow, Straw, Dark Yellow, Kim Final   • Clarity, UA 10/04/2022 Cloudy (A)  Clear Final   • Glucose, UA 10/04/2022 Negative  Negative mg/dL Final   • Bilirubin 10/04/2022  Negative  Negative Final   • Ketones, UA 10/04/2022 1+ (A)  Negative Final   • Specific Gravity  10/04/2022 1.030  1.005 - 1.030 Final   • Blood, UA 10/04/2022 Negative  Negative Final   • pH, Urine 10/04/2022 5.0  5.0 - 8.0 Final   • Protein, POC 10/04/2022 Negative  Negative mg/dL Final   • Urobilinogen, UA 10/04/2022 Normal  Normal, 0.2 E.U./dL Final   • Nitrite, UA 10/04/2022 Negative  Negative Final   • Leukocytes 10/04/2022 Negative  Negative Final       EKG Results:  No orders to display       Imaging Results:  XR Chest 2 View    Result Date: 11/1/2022  No evidence for acute pulmonary process. Follow-up as clinical indications persist.  This report was finalized on 11/1/2022 3:16 PM by Dr. Irwin Winters M.D.      CT Head Without Contrast    Result Date: 1/9/2023   1. Beam hardening artifact streaks through the inferior temporal lobes limiting evaluation. Overall the head CT is within normal limits. The etiology of the patient's right upper extremity tremors and shaking is not established on this exam. If clinical symptoms warrant, MRI of the brain could be obtained for more complete assessment. The results were communicated to Dr. Ballesteros in the emergency room by telephone on 01/06/2023 at 4:00 p.m.  Radiation dose reduction techniques were utilized, including automated exposure control and exposure modulation based on body size.  This report was finalized on 1/9/2023 6:29 AM by Dr. Clark Banda M.D.      XR Chest 1 View    Result Date: 1/6/2023  No evidence for acute pulmonary process. Follow-up as clinical indications persist.  This report was finalized on 1/6/2023 1:43 PM by Dr. Irwin Winters M.D.        Assessment & Plan   Problems Addressed this Visit        Psychiatry Problems    Bipolar 2 disorder (HCC) (Chronic)    Relevant Medications    zolpidem (AMBIEN) 5 MG tablet    Vortioxetine HBr (Trintellix) 20 MG tablet    Generalized anxiety disorder with panic attacks    Relevant Medications     zolpidem (AMBIEN) 5 MG tablet    Vortioxetine HBr (Trintellix) 20 MG tablet    PTSD (post-traumatic stress disorder)    Relevant Medications    zolpidem (AMBIEN) 5 MG tablet    Vortioxetine HBr (Trintellix) 20 MG tablet       Other    RLS (restless legs syndrome)    Relevant Orders    Ambulatory Referral to Neurology    Mixed stress and urge urinary incontinence    Memory difficulties    Relevant Orders    Ambulatory Referral to Neurology    Tremor of unknown origin - Primary    Relevant Orders    Ambulatory Referral to Neurology    Insomnia    Relevant Medications    zolpidem (AMBIEN) 5 MG tablet   Diagnoses       Codes Comments    Tremor of unknown origin    -  Primary ICD-10-CM: R25.1  ICD-9-CM: 781.0     Bipolar 2 disorder (HCC)     ICD-10-CM: F31.81  ICD-9-CM: 296.89     Generalized anxiety disorder with panic attacks     ICD-10-CM: F41.1, F41.0  ICD-9-CM: 300.02, 300.01     PTSD (post-traumatic stress disorder)     ICD-10-CM: F43.10  ICD-9-CM: 309.81     Memory difficulties     ICD-10-CM: R41.3  ICD-9-CM: 780.93     RLS (restless legs syndrome)     ICD-10-CM: G25.81  ICD-9-CM: 333.94     Mixed stress and urge urinary incontinence     ICD-10-CM: N39.46  ICD-9-CM: 788.33     Insomnia, unspecified type     ICD-10-CM: G47.00  ICD-9-CM: 780.52         Plan of Care:  1. Depression/anxiety worsening likely due to recent medical conditions, tremor in hands that led to emergency room visit, patient was told by ER doctor that it was likely due to Wellbutrin and trazodone combination, although patient has had tremor for years and was previously off Wellbutrin for at least 6 weeks and tremors did not lessen/improve  2. Discontinue trazodone and Wellbutrin as advised by ER physician  3. Okay to start Ambien 5 mg, take 1 at bedtime and be in bed within 30 minutes, do not drink alcohol or combine medication with Klonopin or gabapentin, there should be at least 4 to 5 hours in between gabapentin/Klonopin and  Ambien  4. Monitor for oversedation, patient on multiple sedating medications/controlled substances  5. Okay to double dose of Trintellix, take 2 of the 10 mg tablets until new prescription is filled  6. Continue Klonopin, gabapentin, Lamictal, prazosin as previously ordered  7. Urgent referral placed to neurology for tremor, migraines, incontinence, RLS  8. Continue counseling 3 times a month with therapist  9. Follow Up with [facility] IN PERSON in around 4 weeks, UDS and narcotics contract at that time    Progress toward goal: Not at goal  Functional Status: Moderate impairment   Prognosis: Fair with Ongoing Treatment     TREATMENT PLAN/GOALS: Continue supportive psychotherapy efforts and medications as indicated. Treatment and medication options discussed during today's visit. Patient acknowledged and verbally consented to continue with current treatment plan and was educated on the importance of compliance with treatment and follow-up appointments.    MEDICATION ISSUES:  SERA reviewed 01/09/2023  • A thorough discussion was had that included review of disease process, need for continued monitoring and additional treatment options including use of pharmacological and non-pharmacological approaches to care, decisions were made and agreed upon by patient and provider.   • Discussed the risks, benefits, and potential side effects of the medications; patient ackowledged and verbally consented.   • Patient is advised to avoid driving or operating heavy machinery if they feel drowsy or over sedated.   • Patient is agreeable to call the office with any worsening of symptoms or onset of intolerable side effects. Patient is agreeable to call 911 or go to the nearest ER should he/she begin having SI/HI.     Barriers: Stress and Other: Medically complex   Strengths: motivated     Short-Term Goals: Patient will be compliant with medication management and note improvement in symptoms over the next 4 to 6 weeks or at next  scheduled visit.  Long-Term Goals: Patient will continue psychotherapy as well as medication regimen without impairment in daily functioning over the next 6 months.      MEDS ORDERED DURING VISIT:  New Medications Ordered This Visit   Medications   • zolpidem (AMBIEN) 5 MG tablet     Sig: Take 1 tablet by mouth At Night As Needed for Sleep.     Dispense:  30 tablet     Refill:  0   • Vortioxetine HBr (Trintellix) 20 MG tablet     Sig: Take 1 tablet by mouth Daily With Breakfast.     Dispense:  30 tablet     Refill:  2       Return in 4 weeks (on 2/6/2023), or IN PERSON.    This document has been electronically signed by XAVI Alvarado  January 9, 2023 14:59 EST    Part of this note may be an electronic transcription/translation of spoken language to printed text using the Dragon Dictation System.    A total of 30 minutes was spent caring for this patient. That time was spent reviewing past notes, updating patient information, assessing patient for S/S of condition being treated, educating patient on different treatment options, placing orders, and documenting in the record.   English

## 2024-03-24 NOTE — ED ADULT TRIAGE NOTE - CHIEF COMPLAINT QUOTE
In May 2020 patient patient requested to have generic Nuva Ring.  Order resent with her request   Patient brought by ambulance from home as reported woke up from sleep complaining of dizziness, lightheadedness and vertigo

## 2024-03-24 NOTE — ED ADULT NURSE NOTE - OBJECTIVE STATEMENT
pt BIB EMS c/o dizziness and weakness. denies loc, chest pain, palpitations. CARMEN x4. pt states she is unable to ambulate, "feeling lightheaded." iv lock 20 g placed to RAC, patent and flushing. no s/s redness, swelling or infiltration. labs collected and sent. no further orders.

## 2024-03-24 NOTE — ED PROVIDER NOTE - PROVIDER TOKENS
PROVIDER:[TOKEN:[[id number]],FOLLOWUP:[1-3 Days]],PROVIDER:[TOKEN:[[id number]],FOLLOWUP:[1-3 Days]]

## 2024-03-24 NOTE — ED PROVIDER NOTE - PATIENT PORTAL LINK FT
You can access the FollowMyHealth Patient Portal offered by [facility] by registering at the following website: [email or web address]. By joining [facility]’s FollowMyHealth portal, you will also be able to view your health information using other applications (apps) compatible with our system.

## 2024-03-24 NOTE — ED PROVIDER NOTE - CARE PROVIDER_API CALL
Roger Lopez  Internal Medicine  [address], [address]  [place] [address]  Phone: ([phone]  Fax: ()-  Follow Up Time: 1-3 Days    Millie Choi  Neurology  [address] [place] [id number]  Phone: [phone]  Fax: [phone]  Follow Up Time: 1-3 Days

## 2024-03-24 NOTE — ED PROVIDER NOTE - PROGRESS NOTE DETAILS
Patient reassessed feels much better however still feeling slightly dizzy will give another dose of meclizine and reassess later in the morning. Received signout from Dr. Mobley patient only pending reevaluation when feeling better. Reevaluated patient at bedside with family.  Patient feeling much improved, symptoms completely resolved, patient ambulating with steady gait, wants to be d/c home to f/u as outpt.  Discussed the results of all diagnostic testing in ED and copies of all reports given.   An opportunity to ask questions was given.  Discussed the importance of prompt, close medical follow-up.  Patient will return with any changes, concerns or persistent / worsening symptoms.  Understanding of all instructions verbalized.

## 2024-03-24 NOTE — ED ADULT NURSE NOTE - NSFALLRISKINTERV_ED_ALL_ED

## 2024-07-10 ENCOUNTER — OFFICE (OUTPATIENT)
Dept: URBAN - METROPOLITAN AREA CLINIC 109 | Facility: CLINIC | Age: 79
Setting detail: OPHTHALMOLOGY
End: 2024-07-10
Payer: MEDICARE

## 2024-07-10 DIAGNOSIS — H33.002: ICD-10-CM

## 2024-07-10 DIAGNOSIS — D31.32: ICD-10-CM

## 2024-07-10 DIAGNOSIS — H40.013: ICD-10-CM

## 2024-07-10 DIAGNOSIS — H16.223: ICD-10-CM

## 2024-07-10 DIAGNOSIS — H43.811: ICD-10-CM

## 2024-07-10 PROCEDURE — 92133 CPTRZD OPH DX IMG PST SGM ON: CPT | Performed by: OPHTHALMOLOGY

## 2024-07-10 PROCEDURE — 92014 COMPRE OPH EXAM EST PT 1/>: CPT | Performed by: OPHTHALMOLOGY

## 2024-07-10 PROCEDURE — 92083 EXTENDED VISUAL FIELD XM: CPT | Performed by: OPHTHALMOLOGY

## 2024-07-10 ASSESSMENT — CONFRONTATIONAL VISUAL FIELD TEST (CVF)
OD_FINDINGS: FULL
OS_FINDINGS: FULL

## 2025-09-11 ENCOUNTER — EMERGENCY (EMERGENCY)
Facility: HOSPITAL | Age: 80
LOS: 1 days | End: 2025-09-11
Attending: EMERGENCY MEDICINE | Admitting: EMERGENCY MEDICINE
Payer: COMMERCIAL

## 2025-09-11 VITALS
WEIGHT: 138.89 LBS | HEART RATE: 75 BPM | OXYGEN SATURATION: 99 % | RESPIRATION RATE: 18 BRPM | SYSTOLIC BLOOD PRESSURE: 172 MMHG | HEIGHT: 65 IN | DIASTOLIC BLOOD PRESSURE: 79 MMHG | TEMPERATURE: 98 F

## 2025-09-11 VITALS
SYSTOLIC BLOOD PRESSURE: 138 MMHG | DIASTOLIC BLOOD PRESSURE: 68 MMHG | RESPIRATION RATE: 18 BRPM | OXYGEN SATURATION: 98 % | HEART RATE: 70 BPM | TEMPERATURE: 98 F

## 2025-09-11 DIAGNOSIS — Z90.89 ACQUIRED ABSENCE OF OTHER ORGANS: Chronic | ICD-10-CM

## 2025-09-11 DIAGNOSIS — Z98.890 OTHER SPECIFIED POSTPROCEDURAL STATES: Chronic | ICD-10-CM

## 2025-09-11 PROCEDURE — 72131 CT LUMBAR SPINE W/O DYE: CPT | Mod: 26

## 2025-09-11 PROCEDURE — 72131 CT LUMBAR SPINE W/O DYE: CPT

## 2025-09-11 PROCEDURE — 72192 CT PELVIS W/O DYE: CPT

## 2025-09-11 PROCEDURE — 96375 TX/PRO/DX INJ NEW DRUG ADDON: CPT

## 2025-09-11 PROCEDURE — 99284 EMERGENCY DEPT VISIT MOD MDM: CPT

## 2025-09-11 PROCEDURE — 76376 3D RENDER W/INTRP POSTPROCES: CPT | Mod: 26

## 2025-09-11 PROCEDURE — 76376 3D RENDER W/INTRP POSTPROCES: CPT

## 2025-09-11 PROCEDURE — 72192 CT PELVIS W/O DYE: CPT | Mod: 26

## 2025-09-11 PROCEDURE — 96374 THER/PROPH/DIAG INJ IV PUSH: CPT

## 2025-09-11 PROCEDURE — 99284 EMERGENCY DEPT VISIT MOD MDM: CPT | Mod: 25

## 2025-09-11 RX ORDER — KETOROLAC TROMETHAMINE 30 MG/ML
10 INJECTION, SOLUTION INTRAMUSCULAR; INTRAVENOUS ONCE
Refills: 0 | Status: DISCONTINUED | OUTPATIENT
Start: 2025-09-11 | End: 2025-09-11

## 2025-09-11 RX ORDER — LIDOCAINE HYDROCHLORIDE 20 MG/ML
1 JELLY TOPICAL ONCE
Refills: 0 | Status: COMPLETED | OUTPATIENT
Start: 2025-09-11 | End: 2025-09-11

## 2025-09-11 RX ORDER — KETOROLAC TROMETHAMINE 30 MG/ML
1 INJECTION, SOLUTION INTRAMUSCULAR; INTRAVENOUS
Qty: 40 | Refills: 0
Start: 2025-09-11 | End: 2025-09-20

## 2025-09-11 RX ORDER — ACETAMINOPHEN 500 MG/5ML
1000 LIQUID (ML) ORAL ONCE
Refills: 0 | Status: COMPLETED | OUTPATIENT
Start: 2025-09-11 | End: 2025-09-11

## 2025-09-11 RX ADMIN — KETOROLAC TROMETHAMINE 10 MILLIGRAM(S): 30 INJECTION, SOLUTION INTRAMUSCULAR; INTRAVENOUS at 12:00

## 2025-09-11 RX ADMIN — KETOROLAC TROMETHAMINE 10 MILLIGRAM(S): 30 INJECTION, SOLUTION INTRAMUSCULAR; INTRAVENOUS at 11:30

## 2025-09-11 RX ADMIN — Medication 400 MILLIGRAM(S): at 11:29

## 2025-09-11 RX ADMIN — Medication 1000 MILLIGRAM(S): at 11:59

## 2025-09-11 RX ADMIN — LIDOCAINE HYDROCHLORIDE 1 PATCH: 20 JELLY TOPICAL at 11:29
